# Patient Record
Sex: MALE | Race: WHITE | HISPANIC OR LATINO | Employment: UNEMPLOYED | URBAN - METROPOLITAN AREA
[De-identification: names, ages, dates, MRNs, and addresses within clinical notes are randomized per-mention and may not be internally consistent; named-entity substitution may affect disease eponyms.]

---

## 2017-09-08 ENCOUNTER — NON-PROVIDER VISIT (OUTPATIENT)
Dept: URGENT CARE | Facility: PHYSICIAN GROUP | Age: 42
End: 2017-09-08

## 2017-09-08 DIAGNOSIS — Z02.1 PRE-EMPLOYMENT DRUG SCREENING: ICD-10-CM

## 2017-09-08 LAB
AMP AMPHETAMINE: NORMAL
COC COCAINE: NORMAL
INT CON NEG: NEGATIVE
INT CON POS: POSITIVE
MET METHAMPHETAMINES: NORMAL
OPI OPIATES: NORMAL
PCP PHENCYCLIDINE: NORMAL
POC DRUG COMMENT 753798-OCCUPATIONAL HEALTH: NORMAL
THC: NORMAL

## 2017-09-08 PROCEDURE — 80305 DRUG TEST PRSMV DIR OPT OBS: CPT | Performed by: PHYSICIAN ASSISTANT

## 2020-10-25 ENCOUNTER — APPOINTMENT (OUTPATIENT)
Dept: RADIOLOGY | Facility: MEDICAL CENTER | Age: 45
End: 2020-10-25
Attending: EMERGENCY MEDICINE
Payer: COMMERCIAL

## 2020-10-25 ENCOUNTER — HOSPITAL ENCOUNTER (EMERGENCY)
Facility: MEDICAL CENTER | Age: 45
End: 2020-10-25
Attending: EMERGENCY MEDICINE
Payer: COMMERCIAL

## 2020-10-25 VITALS
WEIGHT: 258 LBS | SYSTOLIC BLOOD PRESSURE: 133 MMHG | BODY MASS INDEX: 40.49 KG/M2 | RESPIRATION RATE: 12 BRPM | TEMPERATURE: 96.8 F | OXYGEN SATURATION: 98 % | HEART RATE: 78 BPM | DIASTOLIC BLOOD PRESSURE: 68 MMHG | HEIGHT: 67 IN

## 2020-10-25 DIAGNOSIS — M54.9 PAIN, UPPER BACK: ICD-10-CM

## 2020-10-25 PROCEDURE — 71046 X-RAY EXAM CHEST 2 VIEWS: CPT

## 2020-10-25 PROCEDURE — 72070 X-RAY EXAM THORAC SPINE 2VWS: CPT

## 2020-10-25 PROCEDURE — 700111 HCHG RX REV CODE 636 W/ 250 OVERRIDE (IP): Performed by: EMERGENCY MEDICINE

## 2020-10-25 PROCEDURE — 96372 THER/PROPH/DIAG INJ SC/IM: CPT

## 2020-10-25 PROCEDURE — 99283 EMERGENCY DEPT VISIT LOW MDM: CPT

## 2020-10-25 RX ORDER — NAPROXEN 500 MG/1
500 TABLET ORAL 2 TIMES DAILY WITH MEALS
Qty: 20 TAB | Refills: 0 | Status: SHIPPED | OUTPATIENT
Start: 2020-10-25

## 2020-10-25 RX ORDER — KETOROLAC TROMETHAMINE 30 MG/ML
60 INJECTION, SOLUTION INTRAMUSCULAR; INTRAVENOUS ONCE
Status: COMPLETED | OUTPATIENT
Start: 2020-10-25 | End: 2020-10-25

## 2020-10-25 RX ORDER — ORPHENADRINE CITRATE 100 MG/1
100 TABLET, EXTENDED RELEASE ORAL 2 TIMES DAILY
Qty: 10 TAB | Refills: 0 | Status: SHIPPED | OUTPATIENT
Start: 2020-10-25 | End: 2020-10-30

## 2020-10-25 RX ADMIN — KETOROLAC TROMETHAMINE 60 MG: 30 INJECTION, SOLUTION INTRAMUSCULAR; INTRAVENOUS at 21:58

## 2020-10-26 NOTE — ED NOTES
Pt woken from sleep for discharge, drooling on bed, swinging arms reoriented to place, d/c home gait steady
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
## 2020-10-26 NOTE — ED TRIAGE NOTES
Pt waked into triage with steady gait c/o lower back pain, pt has a swollen area to the lower back that is red and warm to the touch, skin intact around the area     Pt & staff masked and in appropriate PPE during encounter.  Pt denies fever/travel or being in contact with anyone testing positive for Covid.  Explained pt the triage process, made pt aware to tell the RN/staff of any changes/concerns, pt verbalized understanding of process and instructions given.  Pt to ER jaci.

## 2020-10-26 NOTE — DISCHARGE INSTRUCTIONS
There is no sign of a significant problem in your back for the most likely cause of your pain is musculoskeletal strain or pinched nerve.  Use the medications that we have prescribed.  These types of episodes generally takes 2 to 3 weeks to fully resolve.  Heat, movement, and gentle stretching and massage can be helpful.    Use the medications we have prescribed for pain and muscle spasm.  See your primary care doctor.  If you do not have one, you can use the clinics listed here.

## 2021-02-15 ENCOUNTER — HOSPITAL ENCOUNTER (EMERGENCY)
Facility: MEDICAL CENTER | Age: 46
End: 2021-02-15
Attending: EMERGENCY MEDICINE
Payer: COMMERCIAL

## 2021-02-15 VITALS
HEART RATE: 98 BPM | TEMPERATURE: 96.7 F | WEIGHT: 251.77 LBS | OXYGEN SATURATION: 98 % | RESPIRATION RATE: 18 BRPM | HEIGHT: 67 IN | BODY MASS INDEX: 39.52 KG/M2 | DIASTOLIC BLOOD PRESSURE: 88 MMHG | SYSTOLIC BLOOD PRESSURE: 148 MMHG

## 2021-02-15 DIAGNOSIS — R36.9 PENILE DISCHARGE: ICD-10-CM

## 2021-02-15 DIAGNOSIS — N34.2 URETHRITIS: ICD-10-CM

## 2021-02-15 DIAGNOSIS — Z20.2 EXPOSURE TO SEXUALLY TRANSMITTED DISEASE (STD): ICD-10-CM

## 2021-02-15 LAB
APPEARANCE UR: ABNORMAL
BACTERIA #/AREA URNS HPF: NEGATIVE /HPF
BILIRUB UR QL STRIP.AUTO: NEGATIVE
COLOR UR: YELLOW
EPI CELLS #/AREA URNS HPF: NEGATIVE /HPF
GLUCOSE UR STRIP.AUTO-MCNC: NEGATIVE MG/DL
HYALINE CASTS #/AREA URNS LPF: ABNORMAL /LPF
KETONES UR STRIP.AUTO-MCNC: NEGATIVE MG/DL
LEUKOCYTE ESTERASE UR QL STRIP.AUTO: ABNORMAL
MICRO URNS: ABNORMAL
NITRITE UR QL STRIP.AUTO: NEGATIVE
PH UR STRIP.AUTO: 5.5 [PH] (ref 5–8)
PROT UR QL STRIP: NEGATIVE MG/DL
RBC # URNS HPF: ABNORMAL /HPF
RBC UR QL AUTO: ABNORMAL
SP GR UR STRIP.AUTO: >=1.03
TREPONEMA PALLIDUM IGG+IGM AB [PRESENCE] IN SERUM OR PLASMA BY IMMUNOASSAY: NORMAL
UROBILINOGEN UR STRIP.AUTO-MCNC: 0.2 MG/DL
WBC #/AREA URNS HPF: ABNORMAL /HPF

## 2021-02-15 PROCEDURE — 87491 CHLMYD TRACH DNA AMP PROBE: CPT

## 2021-02-15 PROCEDURE — 87086 URINE CULTURE/COLONY COUNT: CPT

## 2021-02-15 PROCEDURE — 99284 EMERGENCY DEPT VISIT MOD MDM: CPT

## 2021-02-15 PROCEDURE — 700102 HCHG RX REV CODE 250 W/ 637 OVERRIDE(OP): Performed by: EMERGENCY MEDICINE

## 2021-02-15 PROCEDURE — 36415 COLL VENOUS BLD VENIPUNCTURE: CPT

## 2021-02-15 PROCEDURE — 81001 URINALYSIS AUTO W/SCOPE: CPT

## 2021-02-15 PROCEDURE — 96372 THER/PROPH/DIAG INJ SC/IM: CPT

## 2021-02-15 PROCEDURE — 86780 TREPONEMA PALLIDUM: CPT

## 2021-02-15 PROCEDURE — A9270 NON-COVERED ITEM OR SERVICE: HCPCS | Performed by: EMERGENCY MEDICINE

## 2021-02-15 PROCEDURE — 700111 HCHG RX REV CODE 636 W/ 250 OVERRIDE (IP): Performed by: EMERGENCY MEDICINE

## 2021-02-15 PROCEDURE — 87591 N.GONORRHOEAE DNA AMP PROB: CPT

## 2021-02-15 RX ORDER — DOXYCYCLINE 100 MG/1
100 CAPSULE ORAL 2 TIMES DAILY
Qty: 14 CAPSULE | Refills: 0 | Status: SHIPPED | OUTPATIENT
Start: 2021-02-15 | End: 2021-02-22

## 2021-02-15 RX ORDER — CEFTRIAXONE 500 MG/1
500 INJECTION, POWDER, FOR SOLUTION INTRAMUSCULAR; INTRAVENOUS ONCE
Status: COMPLETED | OUTPATIENT
Start: 2021-02-15 | End: 2021-02-15

## 2021-02-15 RX ORDER — DOXYCYCLINE 100 MG/1
100 TABLET ORAL ONCE
Status: COMPLETED | OUTPATIENT
Start: 2021-02-15 | End: 2021-02-15

## 2021-02-15 RX ADMIN — CEFTRIAXONE SODIUM 500 MG: 500 INJECTION, POWDER, FOR SOLUTION INTRAMUSCULAR; INTRAVENOUS at 09:32

## 2021-02-15 RX ADMIN — PENICILLIN G BENZATHINE 2.4 MILLION UNITS: 1200000 INJECTION, SUSPENSION INTRAMUSCULAR at 09:32

## 2021-02-15 RX ADMIN — DOXYCYCLINE 100 MG: 100 TABLET, FILM COATED ORAL at 09:36

## 2021-02-15 NOTE — ED NOTES
Pt ambulatory to [facility].  Pt refuses to get off cell phone for assessment and refuses to get into gown.  Chart up for ERP.

## 2021-02-15 NOTE — ED NOTES
Pt medicated per mar.  Pt verbalizes understanding of discharge and follow-up instructions.  VSS.  All questions answered.  Ambulates to discharge with steady gait.

## 2021-02-15 NOTE — DISCHARGE INSTRUCTIONS
Follow-up with primary care or [facility] health department this week for reevaluation, medication management and additional testing as indicated.    Doxycycline twice daily for 7 days.    Return to the emergency department for persistent or worsening penile pain, drainage, difficulty urinating, scrotal pain or swelling, abdominal pain, fever, vomiting or other new concerns.

## 2021-02-15 NOTE — ED NOTES
Blood drawn and sent to lab.  Pt states that he is currently unable to provide a urine sample due to his bladder feeling empty.  Pt provided water per pt request.

## 2021-02-15 NOTE — ED TRIAGE NOTES
Amb to triage w/ c/o penile discharge, painful urination & a sore on tip of penis x 2 days.  Pt states his sig other reports similar symptoms and is being treated for an STD.

## 2021-02-15 NOTE — ED PROVIDER NOTES
"ED Provider Note    CHIEF COMPLAINT  Chief Complaint   Patient presents with   • Penile Discharge       HPI  Vega Harrell is a 45 y.o. male who presents to the emergency department through triage for penile pain and drainage.  Patient believes he has a \"venereal disease\" as he states that his girlfriend recently told him she has 1.  He does not know which infection she is being treated for.  Mild discomfort with urination for 2 days, drainage from penile tip for 2 days as well a \"sore\" near the tip of his penis.  No abdominal pain or flank pain.  No scrotal pain or swelling.  No hematuria.  No fever chills.  Denies history of similar symptoms with previous STD.    REVIEW OF SYSTEMS  See HPI for further details. All other systems are negative.     PAST MEDICAL HISTORY   has a past medical history of Diabetes (HCC) and Sleep apnea.    SOCIAL HISTORY  Social History     Tobacco Use   • Smoking status: Current Every Day Smoker     Packs/day: 1.00     Types: Cigarettes   Substance and Sexual Activity   • Alcohol use: Yes     Comment: \"a lot everyday\"    • Drug use: Yes     Comment: DMT   • Sexual activity: Not on file       SURGICAL HISTORY  patient denies any surgical history    CURRENT MEDICATIONS  Home Medications    **Home medications have not yet been reviewed for this encounter**         ALLERGIES  Allergies   Allergen Reactions   • Tagafed [Triprolidine-Pse]        PHYSICAL EXAM  VITAL SIGNS: /94   Pulse (!) 114   Temp 35.9 °C (96.7 °F) (Temporal)   Resp 16   Ht 1.702 m (5' 7\")   Wt 114 kg (251 lb 12.3 oz)   SpO2 95%   BMI 39.43 kg/m²   Pulse ox interpretation: I interpret this pulse ox as normal.  Constitutional: Alert in no apparent distress.  Anxious.  HENT: Normocephalic, atraumatic. Bilateral external ears normal, Nose normal.   Eyes: Conjunctiva normal.   Neck: Normal range of motion.   Lymphatic: No lymphadenopathy noted.  No inguinal mass or lymphadenopathy.  Cardiovascular: Normal " peripheral perfusion.  Thorax & Lungs: Nonlabored respirations.  Abdomen: Soft, non-distended, non-tender to palpation.   : Circumcised.  Clear, slightly yellow tinted discharge from meatus.  Small, subcentimeter, dry, tender excoriated lesion to the dorsal tip of the penis at the 12 o'clock position of the meatus.  No vesicle or obvious canker.  No scrotal pain, discoloration or swelling.  Skin: Warm, Dry, No erythema, No rash.   Musculoskeletal: Good range of motion in all major joints.   Neurologic: Alert and orient x4.  Ambulates independently.  Psychiatric: Affect normal, Judgment normal, Mood normal.       DIAGNOSTIC STUDIES / PROCEDURE    LABS  Pending    COURSE & MEDICAL DECISION MAKING  Nursing notes and vital signs were reviewed. (See chart for details)  The patients records were reviewed, history was obtained from the patient;     Evaluation is most suggestive of urethritis, likely secondary to STD as patient has been recently exposed.  His exposure specific infection is unknown.  He does have penile drainage.  Urine testing pending, patient given Rocephin 500 mg intramuscularly in the emergency department first dose of doxycycline.  He has an excoriated tender lesion at the tip of the penis, not entirely consistent with canker, but he will be tested and treated empirically for syphilis as well with penicillin G2 400,000 units intramuscularly.  He urinates without difficulty.  There is no clinical evidence for pyelonephritis.  Abdominal exam is benign.  Hemodynamically stable otherwise, mild tachycardia is appropriate in this setting as he appears quite anxious as well, no fever or hypotension.    Patient is stable for discharge at this time, anticipatory guidance provided, doxycycline for 7 days, close follow-up is encouraged with the health department and advised for additional testing to include HIV or hepatitis if indicated, and strict ED return instructions have been detailed. Patient is agreeable  to the disposition and plan.    Patient's blood pressure was elevated in the emergency department, and has been referred to primary care for close monitoring.    FINAL IMPRESSION  (R36.9) Penile discharge  (N34.2) Urethritis  (Z20.2) Exposure to sexually transmitted disease (STD)      Electronically signed by: Shayna White D.O., 2/15/2021 8:55 AM      This dictation was created using voice recognition software. The accuracy of the dictation is limited to the abilities of the software. I expect there may be some errors of grammar and possibly content. The nursing notes were reviewed and certain aspects of this information were incorporated into this note.

## 2021-02-16 LAB
C TRACH DNA SPEC QL NAA+PROBE: NEGATIVE
N GONORRHOEA DNA SPEC QL NAA+PROBE: POSITIVE
SPECIMEN SOURCE: ABNORMAL

## 2021-02-17 LAB
BACTERIA UR CULT: ABNORMAL
BACTERIA UR CULT: ABNORMAL
SIGNIFICANT IND 70042: ABNORMAL
SITE SITE: ABNORMAL
SOURCE SOURCE: ABNORMAL

## 2021-02-17 NOTE — ED NOTES
"ED Positive Culture Follow-up/Notification Note:    Date: 2/17/21     Patient seen in the ED on 2/15/2021 for penile discharge and STI exposure.     1. Penile discharge    2. Urethritis    3. Exposure to sexually transmitted disease (STD)       Discharge Medication List as of 2/15/2021  9:37 AM      START taking these medications    Details   doxycycline (MONODOX) 100 MG capsule Take 1 capsule by mouth 2 times a day for 7 days., Disp-14 capsule, R-0, Normal             Allergies: Tagafed [triprolidine-pse]     Vitals:    02/15/21 0810 02/15/21 0814 02/15/21 0942   BP: 155/94  148/88   Pulse: (!) 114  98   Resp: 16  18   Temp: 35.9 °C (96.7 °F)     TempSrc: Temporal     SpO2: 95%  98%   Weight:  114 kg (251 lb 12.3 oz)    Height: 1.702 m (5' 7\") 1.702 m (5' 7\")        Final cultures:   Results     Procedure Component Value Units Date/Time    Chlamydia/GC PCR Urine Or Swab [[id number]]  (Abnormal) Collected: 02/15/21 0913    Order Status: Completed Specimen: Urine from Genital Updated: 02/16/21 1848     C. trachomatis by PCR Negative     N. gonorrhoeae by PCR POSITIVE     Source URINE    Narrative:      Indication for culture:->Patient WITHOUT an indwelling Bruce  catheter in place with new onset of Dysuria, Frequency,  Urgency, and/or Suprapubic pain    URINE CULTURE(NEW) [[id number]] Collected: 02/15/21 0913    Order Status: Completed Specimen: Urine Updated: 02/16/21 1430     Significant Indicator NEG     Source UR     Site -     Culture Result No growth at 24 hours.    Narrative:      Indication for culture:->Patient WITHOUT an indwelling Bruce  catheter in place with new onset of Dysuria, Frequency,  Urgency, and/or Suprapubic pain    URINALYSIS CULTURE, IF INDICATED [[id number]]  (Abnormal) Collected: 02/15/21 0913    Order Status: Completed Specimen: Urine Updated: 02/15/21 1021     Color Yellow     Character Hazy     Specific Gravity >=1.030     Ph 5.5     Glucose Negative mg/dL      Ketones Negative mg/dL      " Protein Negative mg/dL      Bilirubin Negative     Urobilinogen, Urine 0.2     Nitrite Negative     Leukocyte Esterase Small     Occult Blood Trace     Micro Urine Req Microscopic    Narrative:      Indication for culture:->Patient WITHOUT an indwelling Bruce  catheter in place with new onset of Dysuria, Frequency,  Urgency, and/or Suprapubic pain          Plan:   Patient treated for gonorrhea in the ER. No additional treatment necessary and does not need to continue the doxycyline. Spoke to the patient via telephone. Counseled the patient to abstain from sexual intercourse 7 days after antibiotic therapy is completed, to stop taking the doxycycline and discard the remaining capsules, to notify any sexual partners within the last 60 days to go the the Health Department for testing, to seek further HIV/STD testing, and to seek medical attention if symptoms persist. Patient voiced understanding and did not have any other questions.    Yesika House, PharmD  T: [phone]

## 2021-05-01 ENCOUNTER — HOSPITAL ENCOUNTER (EMERGENCY)
Dept: HOSPITAL 8 - ED | Age: 46
Discharge: HOME | End: 2021-05-01
Payer: SELF-PAY

## 2021-05-01 VITALS — DIASTOLIC BLOOD PRESSURE: 88 MMHG | SYSTOLIC BLOOD PRESSURE: 152 MMHG

## 2021-05-01 VITALS — BODY MASS INDEX: 38.66 KG/M2 | HEIGHT: 66 IN | WEIGHT: 240.52 LBS

## 2021-05-01 DIAGNOSIS — E11.620: ICD-10-CM

## 2021-05-01 DIAGNOSIS — R06.89: ICD-10-CM

## 2021-05-01 DIAGNOSIS — R05: ICD-10-CM

## 2021-05-01 DIAGNOSIS — E11.65: Primary | ICD-10-CM

## 2021-05-01 DIAGNOSIS — J02.9: ICD-10-CM

## 2021-05-01 DIAGNOSIS — Z20.822: ICD-10-CM

## 2021-05-01 LAB
ALBUMIN SERPL-MCNC: 3.3 G/DL (ref 3.4–5)
ALP SERPL-CCNC: 109 U/L (ref 45–117)
ALT SERPL-CCNC: 59 U/L (ref 12–78)
ANION GAP SERPL CALC-SCNC: 5 MMOL/L (ref 5–15)
BASOPHILS # BLD AUTO: 0 X10^3/UL (ref 0–0.1)
BASOPHILS NFR BLD AUTO: 1 % (ref 0–1)
BILIRUB SERPL-MCNC: 0.4 MG/DL (ref 0.2–1)
CALCIUM SERPL-MCNC: 9 MG/DL (ref 8.5–10.1)
CHLORIDE SERPL-SCNC: 103 MMOL/L (ref 98–107)
CREAT SERPL-MCNC: 1.28 MG/DL (ref 0.7–1.3)
EOSINOPHIL # BLD AUTO: 0.3 X10^3/UL (ref 0–0.4)
EOSINOPHIL NFR BLD AUTO: 4 % (ref 1–7)
ERYTHROCYTE [DISTWIDTH] IN BLOOD BY AUTOMATED COUNT: 12.2 % (ref 9.4–14.8)
LYMPHOCYTES # BLD AUTO: 2.4 X10^3/UL (ref 1–3.4)
LYMPHOCYTES NFR BLD AUTO: 32 % (ref 22–44)
MCH RBC QN AUTO: 31.3 PG (ref 27.5–34.5)
MCHC RBC AUTO-ENTMCNC: 34.8 G/DL (ref 33.2–36.2)
MD: NO
MICROSCOPIC: (no result)
MONOCYTES # BLD AUTO: 0.7 X10^3/UL (ref 0.2–0.8)
MONOCYTES NFR BLD AUTO: 9 % (ref 2–9)
NEUTROPHILS # BLD AUTO: 4.1 X10^3/UL (ref 1.8–6.8)
NEUTROPHILS NFR BLD AUTO: 55 % (ref 42–75)
PLATELET # BLD AUTO: 220 X10^3/UL (ref 130–400)
PMV BLD AUTO: 9.3 FL (ref 7.4–10.4)
PROT SERPL-MCNC: 6.8 G/DL (ref 6.4–8.2)
RBC # BLD AUTO: 5.08 X10^6/UL (ref 4.38–5.82)

## 2021-05-01 PROCEDURE — 80053 COMPREHEN METABOLIC PANEL: CPT

## 2021-05-01 PROCEDURE — 82962 GLUCOSE BLOOD TEST: CPT

## 2021-05-01 PROCEDURE — 96361 HYDRATE IV INFUSION ADD-ON: CPT

## 2021-05-01 PROCEDURE — U0003 INFECTIOUS AGENT DETECTION BY NUCLEIC ACID (DNA OR RNA); SEVERE ACUTE RESPIRATORY SYNDROME CORONAVIRUS 2 (SARS-COV-2) (CORONAVIRUS DISEASE [COVID-19]), AMPLIFIED PROBE TECHNIQUE, MAKING USE OF HIGH THROUGHPUT TECHNOLOGIES AS DESCRIBED BY CMS-2020-01-R: HCPCS

## 2021-05-01 PROCEDURE — 81003 URINALYSIS AUTO W/O SCOPE: CPT

## 2021-05-01 PROCEDURE — 85025 COMPLETE CBC W/AUTO DIFF WBC: CPT

## 2021-05-01 PROCEDURE — 99284 EMERGENCY DEPT VISIT MOD MDM: CPT

## 2021-05-01 PROCEDURE — 36415 COLL VENOUS BLD VENIPUNCTURE: CPT

## 2021-05-01 PROCEDURE — 71045 X-RAY EXAM CHEST 1 VIEW: CPT

## 2021-05-01 PROCEDURE — 96374 THER/PROPH/DIAG INJ IV PUSH: CPT

## 2021-05-01 NOTE — NUR
PT AMBULATED TO ROOM FROM TRIAGE. PT CO HIGH BS FOR THE PAST 2 WEEKS. PT STATED 
THAT HIS GLUCOMETER KEEPS READING "HI" DESPITE TAKING METFORMIN DAILY. PT 
STATED THAT HE HAS HAD INCREASED URINATION AND THIRST FOR 2 WEEKS. PT ALSO 
STATED THAT HE WAS EXPOSED TO A FAMILY MEMBER POSITIVE FOR COVID 2 WEEKS AGO. 
PT STATED THAT HE HAS A MILD COUGH AND BODY ACHES.

## 2021-05-20 ENCOUNTER — HOSPITAL ENCOUNTER (EMERGENCY)
Facility: MEDICAL CENTER | Age: 46
End: 2021-05-20
Attending: EMERGENCY MEDICINE
Payer: COMMERCIAL

## 2021-05-20 VITALS
HEART RATE: 80 BPM | BODY MASS INDEX: 39.24 KG/M2 | WEIGHT: 250 LBS | SYSTOLIC BLOOD PRESSURE: 128 MMHG | TEMPERATURE: 98.2 F | RESPIRATION RATE: 16 BRPM | OXYGEN SATURATION: 98 % | DIASTOLIC BLOOD PRESSURE: 77 MMHG | HEIGHT: 67 IN

## 2021-05-20 DIAGNOSIS — R53.83 FATIGUE, UNSPECIFIED TYPE: ICD-10-CM

## 2021-05-20 DIAGNOSIS — R73.9 HYPERGLYCEMIA: ICD-10-CM

## 2021-05-20 LAB
ALBUMIN SERPL BCP-MCNC: 4.4 G/DL (ref 3.2–4.9)
ALBUMIN/GLOB SERPL: 1.4 G/DL
ALP SERPL-CCNC: 112 U/L (ref 30–99)
ALT SERPL-CCNC: 39 U/L (ref 2–50)
ANION GAP SERPL CALC-SCNC: 11 MMOL/L (ref 7–16)
AST SERPL-CCNC: 26 U/L (ref 12–45)
BASE EXCESS BLDV CALC-SCNC: -2 MMOL/L
BASOPHILS # BLD AUTO: 0.3 % (ref 0–1.8)
BASOPHILS # BLD: 0.02 K/UL (ref 0–0.12)
BILIRUB SERPL-MCNC: 0.4 MG/DL (ref 0.1–1.5)
BODY TEMPERATURE: ABNORMAL CENTIGRADE
BUN SERPL-MCNC: 14 MG/DL (ref 8–22)
CALCIUM SERPL-MCNC: 10.2 MG/DL (ref 8.5–10.5)
CHLORIDE SERPL-SCNC: 99 MMOL/L (ref 96–112)
CO2 SERPL-SCNC: 24 MMOL/L (ref 20–33)
CREAT SERPL-MCNC: 0.92 MG/DL (ref 0.5–1.4)
EOSINOPHIL # BLD AUTO: 0.09 K/UL (ref 0–0.51)
EOSINOPHIL NFR BLD: 1.3 % (ref 0–6.9)
ERYTHROCYTE [DISTWIDTH] IN BLOOD BY AUTOMATED COUNT: 37.5 FL (ref 35.9–50)
GLOBULIN SER CALC-MCNC: 3.1 G/DL (ref 1.9–3.5)
GLUCOSE BLD-MCNC: 167 MG/DL (ref 65–99)
GLUCOSE BLD-MCNC: 296 MG/DL (ref 65–99)
GLUCOSE BLD-MCNC: 356 MG/DL (ref 65–99)
GLUCOSE SERPL-MCNC: 344 MG/DL (ref 65–99)
HCO3 BLDV-SCNC: 22 MMOL/L (ref 24–28)
HCT VFR BLD AUTO: 47.7 % (ref 42–52)
HGB BLD-MCNC: 16.6 G/DL (ref 14–18)
IMM GRANULOCYTES # BLD AUTO: 0.05 K/UL (ref 0–0.11)
IMM GRANULOCYTES NFR BLD AUTO: 0.7 % (ref 0–0.9)
LYMPHOCYTES # BLD AUTO: 2.08 K/UL (ref 1–4.8)
LYMPHOCYTES NFR BLD: 29.9 % (ref 22–41)
MCH RBC QN AUTO: 30.5 PG (ref 27–33)
MCHC RBC AUTO-ENTMCNC: 34.8 G/DL (ref 33.7–35.3)
MCV RBC AUTO: 87.7 FL (ref 81.4–97.8)
MONOCYTES # BLD AUTO: 0.48 K/UL (ref 0–0.85)
MONOCYTES NFR BLD AUTO: 6.9 % (ref 0–13.4)
NEUTROPHILS # BLD AUTO: 4.24 K/UL (ref 1.82–7.42)
NEUTROPHILS NFR BLD: 60.9 % (ref 44–72)
NRBC # BLD AUTO: 0 K/UL
NRBC BLD-RTO: 0 /100 WBC
PCO2 BLDV: 36.6 MMHG (ref 41–51)
PH BLDV: 7.4 [PH] (ref 7.31–7.45)
PLATELET # BLD AUTO: 205 K/UL (ref 164–446)
PMV BLD AUTO: 11.2 FL (ref 9–12.9)
PO2 BLDV: 28.5 MMHG (ref 25–40)
POTASSIUM SERPL-SCNC: 4 MMOL/L (ref 3.6–5.5)
PROT SERPL-MCNC: 7.5 G/DL (ref 6–8.2)
RBC # BLD AUTO: 5.44 M/UL (ref 4.7–6.1)
SAO2 % BLDV: 57.5 %
SODIUM SERPL-SCNC: 134 MMOL/L (ref 135–145)
WBC # BLD AUTO: 7 K/UL (ref 4.8–10.8)

## 2021-05-20 PROCEDURE — 96374 THER/PROPH/DIAG INJ IV PUSH: CPT

## 2021-05-20 PROCEDURE — 82803 BLOOD GASES ANY COMBINATION: CPT

## 2021-05-20 PROCEDURE — 82962 GLUCOSE BLOOD TEST: CPT | Mod: 91

## 2021-05-20 PROCEDURE — 85025 COMPLETE CBC W/AUTO DIFF WBC: CPT

## 2021-05-20 PROCEDURE — 700102 HCHG RX REV CODE 250 W/ 637 OVERRIDE(OP): Performed by: EMERGENCY MEDICINE

## 2021-05-20 PROCEDURE — 80053 COMPREHEN METABOLIC PANEL: CPT

## 2021-05-20 PROCEDURE — 94760 N-INVAS EAR/PLS OXIMETRY 1: CPT

## 2021-05-20 PROCEDURE — 700105 HCHG RX REV CODE 258: Performed by: EMERGENCY MEDICINE

## 2021-05-20 PROCEDURE — 99284 EMERGENCY DEPT VISIT MOD MDM: CPT

## 2021-05-20 RX ORDER — SODIUM CHLORIDE, SODIUM LACTATE, POTASSIUM CHLORIDE, CALCIUM CHLORIDE 600; 310; 30; 20 MG/100ML; MG/100ML; MG/100ML; MG/100ML
2000 INJECTION, SOLUTION INTRAVENOUS ONCE
Status: COMPLETED | OUTPATIENT
Start: 2021-05-20 | End: 2021-05-20

## 2021-05-20 RX ADMIN — INSULIN HUMAN 10 UNITS: 100 INJECTION, SOLUTION PARENTERAL at 19:34

## 2021-05-20 RX ADMIN — SODIUM CHLORIDE, POTASSIUM CHLORIDE, SODIUM LACTATE AND CALCIUM CHLORIDE 2000 ML: 600; 310; 30; 20 INJECTION, SOLUTION INTRAVENOUS at 19:18

## 2021-05-20 NOTE — ED NOTES
"Pt ambulated to room. Pt upset because \"I am thirsty and no ones giving me water\". Pt uncooperative with staff. Pt refuses to change into a gown and stated \"Im leaving because I dont want to wear a gown\". Pt refusing to sign AMA. Pt ambulated out of room and left.   "

## 2021-05-21 NOTE — ED NOTES
PIV established, labs sent.,  Pt report metformin gives him abd pain/diahrrea so he has not taken it in a few days

## 2021-05-21 NOTE — ED NOTES
DC instructions reviewed with pt. Pt verbalized understanding. Pt ambulated to [facility] with steady gait.

## 2021-05-21 NOTE — ED PROVIDER NOTES
"ED Provider Note    Scribed for Landon León M.D. by Roxanna Porras. 5/20/2021  6:10 PM    Primary care provider: Pcp Pt States None  Means of arrival: Walk in  History obtained from: patient   History limited by: none    CHIEF COMPLAINT  Chief Complaint   Patient presents with    Hyperglycemia     Pt BIB self to triage for c/o hyperglycemia. pt states has not been able to control it for about 2wks. pt states to only have long lasting insulin at home.  in triage    Fatigue       HPI  Vega Chery is a 46 y.o. male with a history of diabetes, who presents to the Emergency Department for hyperglycemia onset 2 weeks ago. Patient states he only las his lantis at home and does not have any short acting insulin for around 1 month. He has an appointment scheduled at [facility] tomorrow. Patient states he was seen at [facility] two weeks ago for the same symptoms, but was not given any prescriptions. He has a new rash to his right lower leg. Denies fevers, nausea, or vomiting.    REVIEW OF SYSTEMS  Pertinent positives include hyperglycemia and rash. Pertinent negatives include no fever, nausea, or vomiting.  All other systems reviewed and negative.    PAST MEDICAL HISTORY   has a past medical history of Diabetes (HCC) and Sleep apnea.    SURGICAL HISTORY  patient denies any surgical history    SOCIAL HISTORY  Social History     Tobacco Use    Smoking status: Current Every Day Smoker     Packs/day: 1.00     Types: Cigarettes    Smokeless tobacco: Never Used   Substance Use Topics    Alcohol use: Yes     Comment: \"a lot everyday\"     Drug use: Yes     Comment: DMT      Social History     Substance and Sexual Activity   Drug Use Yes    Comment: DMT       FAMILY HISTORY  History reviewed. No pertinent family history.    CURRENT MEDICATIONS  Home Medications    **Home medications have not yet been reviewed for this encounter**         ALLERGIES  Allergies   Allergen Reactions    Tagafed [Triprolidine-Pse]        PHYSICAL " "EXAM  VITAL SIGNS: BP (!) 163/102   Pulse (!) 119   Temp 36.8 °C (98.2 °F) (Oral)   Resp 20   Ht 1.702 m (5' 7\")   Wt 113 kg (250 lb)   SpO2 96%   BMI 39.16 kg/m²     Constitutional: Well developed, Well nourished, Non-toxic appearance.   HENT: Normocephalic, Atraumatic, Bilateral external ears normal, Dry mucous membranes, No oral exudates.   Eyes: PERRLA, EOMI, Conjunctiva normal, No discharge.   Neck: No tenderness, Supple, No stridor.   Lymphatic: No lymphadenopathy noted.   Cardiovascular: Tachycardic, Normal rhythm.   Thorax & Lungs: Clear to auscultation bilaterally, No respiratory distress, No wheezing, No crackles.   Abdomen: Soft, No tenderness, No masses, No pulsatile masses.   Skin: Warm, Dry, No erythema, slight petechial rash bilateral lower extremities.   Extremities:, No edema No cyanosis.   Musculoskeletal: No tenderness to palpation or major deformities noted.  Intact distal pulses  Neurologic: Awake, alert. Moves all extremities spontaneously.  Psychiatric: Affect normal, Judgment normal, Mood normal.     LABS  Results for orders placed or performed during the hospital encounter of 05/20/21   CBC WITH DIFFERENTIAL   Result Value Ref Range    WBC 7.0 4.8 - 10.8 K/uL    RBC 5.44 4.70 - 6.10 M/uL    Hemoglobin 16.6 14.0 - 18.0 g/dL    Hematocrit 47.7 42.0 - 52.0 %    MCV 87.7 81.4 - 97.8 fL    MCH 30.5 27.0 - 33.0 pg    MCHC 34.8 33.7 - 35.3 g/dL    RDW 37.5 35.9 - 50.0 fL    Platelet Count 205 164 - 446 K/uL    MPV 11.2 9.0 - 12.9 fL    Neutrophils-Polys 60.90 44.00 - 72.00 %    Lymphocytes 29.90 22.00 - 41.00 %    Monocytes 6.90 0.00 - 13.40 %    Eosinophils 1.30 0.00 - 6.90 %    Basophils 0.30 0.00 - 1.80 %    Immature Granulocytes 0.70 0.00 - 0.90 %    Nucleated RBC 0.00 /100 WBC    Neutrophils (Absolute) 4.24 1.82 - 7.42 K/uL    Lymphs (Absolute) 2.08 1.00 - 4.80 K/uL    Monos (Absolute) 0.48 0.00 - 0.85 K/uL    Eos (Absolute) 0.09 0.00 - 0.51 K/uL    Baso (Absolute) 0.02 0.00 - 0.12 K/uL "    Immature Granulocytes (abs) 0.05 0.00 - 0.11 K/uL    NRBC (Absolute) 0.00 K/uL   COMP METABOLIC PANEL   Result Value Ref Range    Sodium 134 (L) 135 - 145 mmol/L    Potassium 4.0 3.6 - 5.5 mmol/L    Chloride 99 96 - 112 mmol/L    Co2 24 20 - 33 mmol/L    Anion Gap 11.0 7.0 - 16.0    Glucose 344 (H) 65 - 99 mg/dL    Bun 14 8 - 22 mg/dL    Creatinine 0.92 0.50 - 1.40 mg/dL    Calcium 10.2 8.5 - 10.5 mg/dL    AST(SGOT) 26 12 - 45 U/L    ALT(SGPT) 39 2 - 50 U/L    Alkaline Phosphatase 112 (H) 30 - 99 U/L    Total Bilirubin 0.4 0.1 - 1.5 mg/dL    Albumin 4.4 3.2 - 4.9 g/dL    Total Protein 7.5 6.0 - 8.2 g/dL    Globulin 3.1 1.9 - 3.5 g/dL    A-G Ratio 1.4 g/dL   VENOUS BLOOD GAS   Result Value Ref Range    Venous Bg Ph 7.40 7.31 - 7.45    Venous Bg Pco2 36.6 (L) 41.0 - 51.0 mmHg    Venous Bg Po2 28.5 25.0 - 40.0 mmHg    Venous Bg O2 Saturation 57.5 %    Venous Bg Hco3 22 (L) 24 - 28 mmol/L    Venous Bg Base Excess -2 mmol/L    Body Temp see below Centigrade   ESTIMATED GFR   Result Value Ref Range    GFR If African American >60 >60 mL/min/1.73 m 2    GFR If Non African American >60 >60 mL/min/1.73 m 2   POCT glucose device results   Result Value Ref Range    Glucose - Accu-Ck 356 (H) 65 - 99 mg/dL   POCT glucose device results   Result Value Ref Range    Glucose - Accu-Ck 296 (H) 65 - 99 mg/dL        COURSE & MEDICAL DECISION MAKING  Pertinent Labs & Imaging studies reviewed. (See chart for details)    6:10 PM - Patient seen and examined at bedside.  I discussed that we will obtain labs to further evaluate for DKA. Patient will be treated with IV fluids for hyperglycemia. Ordered VBG, beta-hydroxybutyric acid, CBC w/ diff, CMP, POCT UA, and POCT glucose to evaluate his symptoms. The differential diagnoses include but are not limited to: hyperglycemia vs DKA.    7:18 PM - Reviewed the patient's lab results. He will be treated with insulin regular.    8:04 PM - Patient was reevaluated at bedside. Discussed lab results  with the patient and informed them that we will recheck his blood glucose and if this is improve he will be discharged home.      9:38 PM - Patients blood glucose is improved at 296. Advised him to keep his appointment with the [facility] clinic tomorrow. Discussed return precautions. Patient will be discharged at this time. He verbalizes agreement with discharge and plan of care.     Decision Making:  Patient with hyperglycemia, no evidence of DKA, give the patient 2 L of fluid for his hyperglycemia with improvement of the patient's symptoms, give the patient 10 units of insulin IV which brought the patient's sugar down to 160, the patient has follow-up appoint with the [facility] clinic tomorrow, the patient will return with any concerns.    HYDRATION: Based on the patient's presentation of Hyperglycemia the patient was given IV fluids. IV Hydration was used because oral hydration was not adequate alone. Upon recheck following hydration, the patient was improved.    The patient will return for new or worsening symptoms and is stable at the time of discharge.    DISPOSITION:  Patient will be discharged home in stable condition.    FOLLOW UP:  [facility], Emergency Dept  [address] [place] [id number] [phone]    If symptoms worsen    FINAL IMPRESSION  1. Hyperglycemia    2. Fatigue, unspecified type          I, Roxanna Porras (Maritoibhilary), am scribing for, and in the presence of, Landon León M.D..    Electronically signed by: Roxanna Porras (Maritoibhilary), 5/20/2021    ILandon M.D. personally performed the services described in this documentation, as scribed by Roxanna Porras in my presence, and it is both accurate and complete. C    The note accurately reflects work and decisions made by me.  Landon León M.D.  5/20/2021  10:24 PM

## 2021-06-11 PROCEDURE — 99285 EMERGENCY DEPT VISIT HI MDM: CPT

## 2021-06-11 ASSESSMENT — FIBROSIS 4 INDEX: FIB4 SCORE: 0.91

## 2021-06-12 ENCOUNTER — HOSPITAL ENCOUNTER (EMERGENCY)
Facility: MEDICAL CENTER | Age: 46
End: 2021-06-12
Attending: EMERGENCY MEDICINE
Payer: COMMERCIAL

## 2021-06-12 ENCOUNTER — APPOINTMENT (OUTPATIENT)
Dept: RADIOLOGY | Facility: MEDICAL CENTER | Age: 46
End: 2021-06-12
Attending: EMERGENCY MEDICINE
Payer: COMMERCIAL

## 2021-06-12 VITALS
RESPIRATION RATE: 16 BRPM | HEIGHT: 67 IN | TEMPERATURE: 98 F | SYSTOLIC BLOOD PRESSURE: 130 MMHG | OXYGEN SATURATION: 98 % | WEIGHT: 225 LBS | DIASTOLIC BLOOD PRESSURE: 88 MMHG | BODY MASS INDEX: 35.31 KG/M2 | HEART RATE: 98 BPM

## 2021-06-12 DIAGNOSIS — S82.832A CLOSED FRACTURE OF DISTAL END OF LEFT FIBULA, UNSPECIFIED FRACTURE MORPHOLOGY, INITIAL ENCOUNTER: ICD-10-CM

## 2021-06-12 PROCEDURE — 29515 APPLICATION SHORT LEG SPLINT: CPT

## 2021-06-12 PROCEDURE — 700111 HCHG RX REV CODE 636 W/ 250 OVERRIDE (IP): Performed by: EMERGENCY MEDICINE

## 2021-06-12 PROCEDURE — 302874 HCHG BANDAGE ACE 2 OR 3""

## 2021-06-12 PROCEDURE — 700102 HCHG RX REV CODE 250 W/ 637 OVERRIDE(OP): Performed by: EMERGENCY MEDICINE

## 2021-06-12 PROCEDURE — 302875 HCHG BANDAGE ACE 4 OR 6""

## 2021-06-12 PROCEDURE — 73590 X-RAY EXAM OF LOWER LEG: CPT | Mod: LT

## 2021-06-12 PROCEDURE — A9270 NON-COVERED ITEM OR SERVICE: HCPCS | Performed by: EMERGENCY MEDICINE

## 2021-06-12 PROCEDURE — 73610 X-RAY EXAM OF ANKLE: CPT | Mod: LT

## 2021-06-12 PROCEDURE — 96374 THER/PROPH/DIAG INJ IV PUSH: CPT | Mod: XU

## 2021-06-12 RX ORDER — HYDROCODONE BITARTRATE AND ACETAMINOPHEN 5; 325 MG/1; MG/1
2 TABLET ORAL ONCE
Status: COMPLETED | OUTPATIENT
Start: 2021-06-12 | End: 2021-06-12

## 2021-06-12 RX ORDER — HYDROCODONE BITARTRATE AND ACETAMINOPHEN 5; 325 MG/1; MG/1
1 TABLET ORAL EVERY 6 HOURS PRN
Qty: 11 TABLET | Refills: 0 | Status: SHIPPED | OUTPATIENT
Start: 2021-06-12 | End: 2021-06-12 | Stop reason: SDUPTHER

## 2021-06-12 RX ORDER — HYDROCODONE BITARTRATE AND ACETAMINOPHEN 5; 325 MG/1; MG/1
1 TABLET ORAL EVERY 6 HOURS PRN
Qty: 11 TABLET | Refills: 0 | Status: SHIPPED | OUTPATIENT
Start: 2021-06-12 | End: 2021-06-15

## 2021-06-12 RX ADMIN — HYDROCODONE BITARTRATE AND ACETAMINOPHEN 2 TABLET: 5; 325 TABLET ORAL at 01:55

## 2021-06-12 RX ADMIN — FENTANYL CITRATE 100 MCG: 50 INJECTION, SOLUTION INTRAMUSCULAR; INTRAVENOUS at 01:26

## 2021-06-12 ASSESSMENT — PAIN DESCRIPTION - PAIN TYPE
TYPE: ACUTE PAIN
TYPE: ACUTE PAIN

## 2021-06-12 NOTE — ED PROVIDER NOTES
"ED Provider Note        Primary care provider: Pcp Pt States None    I verified that the patient was wearing a mask and I was wearing appropriate PPE every time I entered the room. The patient's mask was on the patient at all times during my encounter except for a brief view of the oropharynx.      CHIEF COMPLAINT  Chief Complaint   Patient presents with   • T-5000 FALL     Pt states he was getting a tatoo and fell out of the tatoo chair. Per pt \"my ankle snapped in half.\" +tenderness and swelling to LLE. Pt states he also hit his face on the ground, +epistaxis, bleeding controlled. Pt states his pain is 10/10       HPI  Vega Harrell is a 46 y.o. male who presents to the Emergency Department with chief complaint of left ankle pain.  Patient was getting the tattoo this evening he got up out of the chair and states that his ankle gave way underneath him.  He did fall the ground struck his nose had some nosebleed but that is since resolved no loss of conscious no neck pain no chest or abdominal pain has been otherwise well recently.  Pain is ankle is severe worse with any manipulation or attempted ambulation no alleviating factors.  Patient does report drinking earlier in the evening he is agitated very verbally aggressive towards nursing staff.    REVIEW OF SYSTEMS  Positive for left ankle pain negative for loss of consciousness chest abdominal pelvic pain pain in other extremities or recent illness    PAST MEDICAL HISTORY   has a past medical history of Diabetes (HCC) and Sleep apnea.    SURGICAL HISTORY  patient denies any surgical history    SOCIAL HISTORY  Social History     Tobacco Use   • Smoking status: Current Every Day Smoker     Packs/day: 1.00     Types: Cigarettes   • Smokeless tobacco: Never Used   Substance Use Topics   • Alcohol use: Yes     Comment: \"a lot everyday\"    • Drug use: Yes     Comment: DMT      Social History     Substance and Sexual Activity   Drug Use Yes    Comment: DMT " "      FAMILY HISTORY  Non-Contributory    CURRENT MEDICATIONS  Home Medications    **Home medications have not yet been reviewed for this encounter**         ALLERGIES  Allergies   Allergen Reactions   • Tagafed [Triprolidine-Pse]        PHYSICAL EXAM  VITAL SIGNS: /86   Pulse (!) 122   Temp 36.5 °C (97.7 °F) (Temporal)   Resp 16   Ht 1.702 m (5' 7\")   Wt 102 kg (225 lb)   SpO2 95%   BMI 35.24 kg/m²     Constitutional: Alert and oriented x 3, Distress  HEENT: Atraumatic normocephalic, pupils are equal round, extraocular movements are intact. The nares is clear, external ears are normal, mouth shows moist mucous membranes  Neck: no obvious JVD or tracheal deviation  Cardiovascular: Regular rate and rhythm no murmur rub or gallop   Thorax & Lungs: No respiratory distress, no wheezes rales or rhonchi, No chest tenderness.   GI: Soft nontender nondistended positive bowel sounds, no peritoneal signs  Skin: Warm dry no obvious acute rash or lesion  Musculoskeletal: Bilateral upper right lower extremity are unremarkable in the left lower extremity patient has full range and strength at the hip and knee he has tenderness to palpation medial and lateral malleolus with minimal edema and ecchymosis in that area normal cap refill and sensation range of motion all toes limited dorsiflexion plantarflexion due to pain.  Neurologic: Cranial nerves III through XII are grossly intact, no sensory deficit, no cerebellar dysfunction   Psychiatric: Agitated verbally abusive to nursing staff      DIAGNOSTIC STUDIES / PROCEDURES      RADIOLOGY  DX-TIBIA AND FIBULA LEFT   Final Result      Unchanged distal fibular fracture. No proximal tibial or fibular fractures.      DX-ANKLE 3+ VIEWS LEFT   Final Result      Acute, minimally comminuted, predominantly obliquely oriented fracture of the distal fibula, at and above the level of the ankle joint.        The radiologist's interpretation of all radiological studies have been " "reviewed by me.    COURSE & MEDICAL DECISION MAKING  Pertinent Labs & Imaging studies reviewed. (See chart for details)        Patient noted to have slightly elevated blood pressure likely circumstantial secondary to presenting complaint. Referred to primary care physician for further evaluation.      Medical Decision Making: Distal fibula fracture as above splinted in short posterior with stirrup splint crutches for ambulation nonweightbearing until followed up by orthopedist follow-up with orthopedist in 5 to 7 days return here for worsening pain numbness tingling weakness any other acute symptoms or concerns otherwise discharged in stable and improved condition.    /88   Pulse 98   Temp 36.7 °C (98 °F) (Temporal)   Resp 16   Ht 1.702 m (5' 7\")   Wt 102 kg (225 lb)   SpO2 98%   BMI 35.24 kg/m²     Jack Owens M.D.  [address] [facility] [address]  [phone]    Schedule an appointment as soon as possible for a visit       [facility], Emergency Dept  [facility] [facility] [address] [phone]    in 12-24 hours if symptoms persist, immediately If symptoms worsen, or if you develop any other symptoms or concerns    Prescription monitoring program queried and unremarkable.  Patient counseled on the risks of controlled substances including potential risks and benefits proper use alternative treatments, cause the symptoms, provisions of treatment plan, risk of dependence addiction and overdose method safely dispose of the medication, the fact that they would be given no refills from the emergency department.     In prescribing controlled substances to this patient, I certify that I have obtained and reviewed the medical history of Vega Harrell. I have also made a good idalia effort to obtain applicable records from other providers who have treated the patient and records did not demonstrate any increased risk of substance abuse that would prevent me from " prescribing controlled substances.     I have conducted a physical exam and documented it. I have reviewed Mr. Harrell’s prescription history as maintained by the [place] Prescription Monitoring Program.     I have assessed the patient’s risk for abuse, dependency, and addiction using the validated Opioid Risk Tool available at https://www.mdcalc.com/nwkplb-mtlu-huyd-ort-narcotic-abuse.     Given the above, I believe the benefits of controlled substance therapy outweigh the risks. The reasons for prescribing controlled substances include non-narcotic, oral analgesic alternatives have been inadequate for pain control. Accordingly, I have discussed the risk and benefits, treatment plan, and alternative therapies with the patient.         FINAL IMPRESSION  1. Closed fracture of distal end of left fibula, unspecified fracture morphology, initial encounter Active         This dictation has been created using voice recognition software and/or scribes. The accuracy of the dictation is limited by the abilities of the software and the expertise of the scribes. I expect there may be some errors of grammar and possibly content. I made every attempt to manually correct the errors within my dictation. However, errors related to voice recognition software and/or scribes may still exist and should be interpreted within the appropriate context.

## 2021-06-12 NOTE — DISCHARGE INSTRUCTIONS
You fractured your distal fibula this evening. Nonweightbearing in this extremity until evaluated by orthopedics. Return for worsening pain swelling numbness tingling any other acute symptoms or concerns otherwise follow-up with orthopedics within 1 week for repeat evaluation

## 2021-06-12 NOTE — ED NOTES
Security officers asked the patient several times to wear a mask and patient refused.Patient is continuously cursing at staff.  The patient was asked to wait outside until called because he refuses to wear a mask properly.

## 2021-06-12 NOTE — ED NOTES
"ED staff followed patient to the restroom to find the patient drinking water out of the sink. The patient stated \"I am diabetic I need water.\" Despite staff's best efforts the patient would not stop drinking water from the sink and began cursing at staff.   "

## 2021-06-12 NOTE — ED TRIAGE NOTES
"Chief Complaint   Patient presents with   • T-5000 FALL     Pt states he was getting a tatoo and fell out of the tatoo chair. Per pt \"my ankle snapped in half.\" +tenderness and swelling to LLE. Pt states he also hit his face on the ground, +epistaxis, bleeding controlled. Pt states his pain is 10/10       Wheelchair to triage for above complaint.   Educated on triage process, encourage to inform staff of any changes.     /86   Pulse (!) 122   Temp 36.5 °C (97.7 °F) (Temporal)   Resp 16   Ht 1.702 m (5' 7\")   Wt 102 kg (225 lb)   SpO2 95%   BMI 35.24 kg/m²     "

## 2021-11-10 ENCOUNTER — HOSPITAL ENCOUNTER (OUTPATIENT)
Facility: MEDICAL CENTER | Age: 46
End: 2021-11-11
Attending: EMERGENCY MEDICINE | Admitting: STUDENT IN AN ORGANIZED HEALTH CARE EDUCATION/TRAINING PROGRAM
Payer: COMMERCIAL

## 2021-11-10 ENCOUNTER — APPOINTMENT (OUTPATIENT)
Dept: RADIOLOGY | Facility: MEDICAL CENTER | Age: 46
End: 2021-11-10
Attending: EMERGENCY MEDICINE
Payer: COMMERCIAL

## 2021-11-10 LAB
ACETONE UR QL: ABNORMAL
ALBUMIN SERPL BCP-MCNC: 4.3 G/DL (ref 3.2–4.9)
ALBUMIN/GLOB SERPL: 1.4 G/DL
ALP SERPL-CCNC: 157 U/L (ref 30–99)
ALT SERPL-CCNC: 21 U/L (ref 2–50)
AMMONIA PLAS-SCNC: 18 UMOL/L (ref 11–45)
ANION GAP SERPL CALC-SCNC: 13 MMOL/L (ref 7–16)
APPEARANCE UR: CLEAR
AST SERPL-CCNC: 17 U/L (ref 12–45)
B-OH-BUTYR SERPL-MCNC: 0.85 MMOL/L (ref 0.02–0.27)
BASE EXCESS BLDV CALC-SCNC: 1 MMOL/L
BASOPHILS # BLD AUTO: 0.4 % (ref 0–1.8)
BASOPHILS # BLD: 0.03 K/UL (ref 0–0.12)
BILIRUB SERPL-MCNC: 0.3 MG/DL (ref 0.1–1.5)
BILIRUB UR QL STRIP.AUTO: NEGATIVE
BODY TEMPERATURE: ABNORMAL CENTIGRADE
BUN SERPL-MCNC: 12 MG/DL (ref 8–22)
CALCIUM SERPL-MCNC: 10 MG/DL (ref 8.5–10.5)
CHLORIDE SERPL-SCNC: 92 MMOL/L (ref 96–112)
CO2 SERPL-SCNC: 23 MMOL/L (ref 20–33)
COLOR UR: YELLOW
CREAT SERPL-MCNC: 0.85 MG/DL (ref 0.5–1.4)
EOSINOPHIL # BLD AUTO: 0.3 K/UL (ref 0–0.51)
EOSINOPHIL NFR BLD: 4 % (ref 0–6.9)
ERYTHROCYTE [DISTWIDTH] IN BLOOD BY AUTOMATED COUNT: 35.9 FL (ref 35.9–50)
GLOBULIN SER CALC-MCNC: 3.1 G/DL (ref 1.9–3.5)
GLUCOSE SERPL-MCNC: 657 MG/DL (ref 65–99)
GLUCOSE UR STRIP.AUTO-MCNC: >=1000 MG/DL
HCO3 BLDV-SCNC: 26 MMOL/L (ref 24–28)
HCT VFR BLD AUTO: 45 % (ref 42–52)
HGB BLD-MCNC: 16.6 G/DL (ref 14–18)
IMM GRANULOCYTES # BLD AUTO: 0.03 K/UL (ref 0–0.11)
IMM GRANULOCYTES NFR BLD AUTO: 0.4 % (ref 0–0.9)
KETONES UR STRIP.AUTO-MCNC: 15 MG/DL
LEUKOCYTE ESTERASE UR QL STRIP.AUTO: NEGATIVE
LIPASE SERPL-CCNC: 18 U/L (ref 11–82)
LYMPHOCYTES # BLD AUTO: 1.96 K/UL (ref 1–4.8)
LYMPHOCYTES NFR BLD: 26.2 % (ref 22–41)
MAGNESIUM SERPL-MCNC: 2.3 MG/DL (ref 1.5–2.5)
MCH RBC QN AUTO: 31.2 PG (ref 27–33)
MCHC RBC AUTO-ENTMCNC: 36.9 G/DL (ref 33.7–35.3)
MCV RBC AUTO: 84.6 FL (ref 81.4–97.8)
MICRO URNS: ABNORMAL
MONOCYTES # BLD AUTO: 0.5 K/UL (ref 0–0.85)
MONOCYTES NFR BLD AUTO: 6.7 % (ref 0–13.4)
NEUTROPHILS # BLD AUTO: 4.66 K/UL (ref 1.82–7.42)
NEUTROPHILS NFR BLD: 62.3 % (ref 44–72)
NITRITE UR QL STRIP.AUTO: NEGATIVE
NRBC # BLD AUTO: 0 K/UL
NRBC BLD-RTO: 0 /100 WBC
OSMOLALITY SERPL: 302 MOSM/KG H2O (ref 278–298)
PCO2 BLDV: 44 MMHG (ref 41–51)
PH BLDV: 7.39 [PH] (ref 7.31–7.45)
PH UR STRIP.AUTO: 5 [PH] (ref 5–8)
PHOSPHATE SERPL-MCNC: 3.6 MG/DL (ref 2.5–4.5)
PLATELET # BLD AUTO: 202 K/UL (ref 164–446)
PMV BLD AUTO: 11.2 FL (ref 9–12.9)
PO2 BLDV: 45.8 MMHG (ref 25–40)
POTASSIUM SERPL-SCNC: 4.3 MMOL/L (ref 3.6–5.5)
PROT SERPL-MCNC: 7.4 G/DL (ref 6–8.2)
PROT UR QL STRIP: NEGATIVE MG/DL
RBC # BLD AUTO: 5.32 M/UL (ref 4.7–6.1)
RBC UR QL AUTO: NEGATIVE
SAO2 % BLDV: 82.3 %
SODIUM SERPL-SCNC: 128 MMOL/L (ref 135–145)
SP GR UR STRIP.AUTO: 1.04
UROBILINOGEN UR STRIP.AUTO-MCNC: 0.2 MG/DL
WBC # BLD AUTO: 7.5 K/UL (ref 4.8–10.8)

## 2021-11-10 PROCEDURE — 700105 HCHG RX REV CODE 258: Performed by: EMERGENCY MEDICINE

## 2021-11-10 PROCEDURE — 83690 ASSAY OF LIPASE: CPT

## 2021-11-10 PROCEDURE — 85025 COMPLETE CBC W/AUTO DIFF WBC: CPT

## 2021-11-10 PROCEDURE — 83930 ASSAY OF BLOOD OSMOLALITY: CPT

## 2021-11-10 PROCEDURE — 81002 URINALYSIS NONAUTO W/O SCOPE: CPT

## 2021-11-10 PROCEDURE — 82010 KETONE BODYS QUAN: CPT

## 2021-11-10 PROCEDURE — 81003 URINALYSIS AUTO W/O SCOPE: CPT

## 2021-11-10 PROCEDURE — 82140 ASSAY OF AMMONIA: CPT

## 2021-11-10 PROCEDURE — 700102 HCHG RX REV CODE 250 W/ 637 OVERRIDE(OP): Performed by: EMERGENCY MEDICINE

## 2021-11-10 PROCEDURE — C9803 HOPD COVID-19 SPEC COLLECT: HCPCS | Performed by: EMERGENCY MEDICINE

## 2021-11-10 PROCEDURE — 96375 TX/PRO/DX INJ NEW DRUG ADDON: CPT

## 2021-11-10 PROCEDURE — 83735 ASSAY OF MAGNESIUM: CPT

## 2021-11-10 PROCEDURE — 83036 HEMOGLOBIN GLYCOSYLATED A1C: CPT

## 2021-11-10 PROCEDURE — 99285 EMERGENCY DEPT VISIT HI MDM: CPT

## 2021-11-10 PROCEDURE — 0241U HCHG SARS-COV-2 COVID-19 NFCT DS RESP RNA 4 TRGT MIC: CPT

## 2021-11-10 PROCEDURE — 70450 CT HEAD/BRAIN W/O DYE: CPT

## 2021-11-10 PROCEDURE — 80053 COMPREHEN METABOLIC PANEL: CPT

## 2021-11-10 PROCEDURE — 82962 GLUCOSE BLOOD TEST: CPT

## 2021-11-10 PROCEDURE — 82803 BLOOD GASES ANY COMBINATION: CPT

## 2021-11-10 PROCEDURE — 84100 ASSAY OF PHOSPHORUS: CPT

## 2021-11-10 RX ORDER — SODIUM CHLORIDE, SODIUM LACTATE, POTASSIUM CHLORIDE, CALCIUM CHLORIDE 600; 310; 30; 20 MG/100ML; MG/100ML; MG/100ML; MG/100ML
1000 INJECTION, SOLUTION INTRAVENOUS ONCE
Status: COMPLETED | OUTPATIENT
Start: 2021-11-10 | End: 2021-11-11

## 2021-11-10 RX ADMIN — SODIUM CHLORIDE, POTASSIUM CHLORIDE, SODIUM LACTATE AND CALCIUM CHLORIDE 1000 ML: 600; 310; 30; 20 INJECTION, SOLUTION INTRAVENOUS at 22:35

## 2021-11-10 RX ADMIN — INSULIN HUMAN 10 UNITS: 100 INJECTION, SOLUTION PARENTERAL at 23:49

## 2021-11-10 RX ADMIN — SODIUM CHLORIDE, POTASSIUM CHLORIDE, SODIUM LACTATE AND CALCIUM CHLORIDE 1000 ML: 600; 310; 30; 20 INJECTION, SOLUTION INTRAVENOUS at 22:37

## 2021-11-10 ASSESSMENT — FIBROSIS 4 INDEX: FIB4 SCORE: 0.91

## 2021-11-11 VITALS
SYSTOLIC BLOOD PRESSURE: 148 MMHG | HEART RATE: 71 BPM | TEMPERATURE: 97.1 F | BODY MASS INDEX: 36.3 KG/M2 | DIASTOLIC BLOOD PRESSURE: 96 MMHG | HEIGHT: 67 IN | OXYGEN SATURATION: 94 % | RESPIRATION RATE: 16 BRPM | WEIGHT: 231.26 LBS

## 2021-11-11 PROBLEM — E11.00 HYPEROSMOLAR HYPERGLYCEMIC STATE (HHS) (HCC): Status: ACTIVE | Noted: 2021-11-11

## 2021-11-11 PROBLEM — E66.9 CLASS 1 OBESITY IN ADULT: Status: ACTIVE | Noted: 2021-11-11

## 2021-11-11 PROBLEM — E87.1 HYPONATREMIA: Status: ACTIVE | Noted: 2021-11-11

## 2021-11-11 PROBLEM — F10.10 ALCOHOL ABUSE: Status: ACTIVE | Noted: 2021-11-11

## 2021-11-11 PROBLEM — F17.200 SMOKER: Status: ACTIVE | Noted: 2021-11-11

## 2021-11-11 LAB
ALBUMIN SERPL BCP-MCNC: 3.6 G/DL (ref 3.2–4.9)
ALBUMIN SERPL BCP-MCNC: 3.6 G/DL (ref 3.2–4.9)
ALBUMIN/GLOB SERPL: 1.5 G/DL
ALBUMIN/GLOB SERPL: 1.6 G/DL
ALP SERPL-CCNC: 118 U/L (ref 30–99)
ALP SERPL-CCNC: 122 U/L (ref 30–99)
ALT SERPL-CCNC: 16 U/L (ref 2–50)
ALT SERPL-CCNC: 16 U/L (ref 2–50)
AMPHET UR QL SCN: POSITIVE
ANION GAP SERPL CALC-SCNC: 5 MMOL/L (ref 7–16)
ANION GAP SERPL CALC-SCNC: 8 MMOL/L (ref 7–16)
ANION GAP SERPL CALC-SCNC: 8 MMOL/L (ref 7–16)
AST SERPL-CCNC: 13 U/L (ref 12–45)
AST SERPL-CCNC: 14 U/L (ref 12–45)
B-OH-BUTYR SERPL-MCNC: 0.48 MMOL/L (ref 0.02–0.27)
BARBITURATES UR QL SCN: NEGATIVE
BASE EXCESS BLDV CALC-SCNC: 2 MMOL/L
BASOPHILS # BLD AUTO: 0.3 % (ref 0–1.8)
BASOPHILS # BLD AUTO: 0.5 % (ref 0–1.8)
BASOPHILS # BLD: 0.02 K/UL (ref 0–0.12)
BASOPHILS # BLD: 0.03 K/UL (ref 0–0.12)
BENZODIAZ UR QL SCN: NEGATIVE
BILIRUB SERPL-MCNC: 0.4 MG/DL (ref 0.1–1.5)
BILIRUB SERPL-MCNC: 0.5 MG/DL (ref 0.1–1.5)
BODY TEMPERATURE: NORMAL CENTIGRADE
BUN SERPL-MCNC: 11 MG/DL (ref 8–22)
BUN SERPL-MCNC: 11 MG/DL (ref 8–22)
BUN SERPL-MCNC: 13 MG/DL (ref 8–22)
BZE UR QL SCN: NEGATIVE
CALCIUM SERPL-MCNC: 8.9 MG/DL (ref 8.5–10.5)
CALCIUM SERPL-MCNC: 8.9 MG/DL (ref 8.5–10.5)
CALCIUM SERPL-MCNC: 9 MG/DL (ref 8.5–10.5)
CANNABINOIDS UR QL SCN: NEGATIVE
CHLORIDE SERPL-SCNC: 100 MMOL/L (ref 96–112)
CHLORIDE SERPL-SCNC: 101 MMOL/L (ref 96–112)
CHLORIDE SERPL-SCNC: 103 MMOL/L (ref 96–112)
CO2 SERPL-SCNC: 24 MMOL/L (ref 20–33)
CO2 SERPL-SCNC: 24 MMOL/L (ref 20–33)
CO2 SERPL-SCNC: 28 MMOL/L (ref 20–33)
CREAT SERPL-MCNC: 0.75 MG/DL (ref 0.5–1.4)
CREAT SERPL-MCNC: 0.75 MG/DL (ref 0.5–1.4)
CREAT SERPL-MCNC: 0.8 MG/DL (ref 0.5–1.4)
EOSINOPHIL # BLD AUTO: 0.32 K/UL (ref 0–0.51)
EOSINOPHIL # BLD AUTO: 0.33 K/UL (ref 0–0.51)
EOSINOPHIL NFR BLD: 4.7 % (ref 0–6.9)
EOSINOPHIL NFR BLD: 5.1 % (ref 0–6.9)
ERYTHROCYTE [DISTWIDTH] IN BLOOD BY AUTOMATED COUNT: 36.3 FL (ref 35.9–50)
ERYTHROCYTE [DISTWIDTH] IN BLOOD BY AUTOMATED COUNT: 37.5 FL (ref 35.9–50)
EST. AVERAGE GLUCOSE BLD GHB EST-MCNC: 329 MG/DL
ETHANOL BLD-MCNC: <10.1 MG/DL (ref 0–10)
FLUAV RNA SPEC QL NAA+PROBE: NEGATIVE
FLUBV RNA SPEC QL NAA+PROBE: NEGATIVE
GLOBULIN SER CALC-MCNC: 2.3 G/DL (ref 1.9–3.5)
GLOBULIN SER CALC-MCNC: 2.4 G/DL (ref 1.9–3.5)
GLUCOSE BLD-MCNC: 247 MG/DL (ref 65–99)
GLUCOSE BLD-MCNC: 291 MG/DL (ref 65–99)
GLUCOSE BLD-MCNC: 348 MG/DL (ref 65–99)
GLUCOSE BLD-MCNC: 359 MG/DL (ref 65–99)
GLUCOSE BLD-MCNC: 368 MG/DL (ref 65–99)
GLUCOSE BLD-MCNC: 477 MG/DL (ref 65–99)
GLUCOSE BLD-MCNC: >600 MG/DL (ref 65–99)
GLUCOSE SERPL-MCNC: 325 MG/DL (ref 65–99)
GLUCOSE SERPL-MCNC: 391 MG/DL (ref 65–99)
GLUCOSE SERPL-MCNC: 421 MG/DL (ref 65–99)
HBA1C MFR BLD: 13.1 % (ref 4–5.6)
HCO3 BLDV-SCNC: 28 MMOL/L (ref 24–28)
HCT VFR BLD AUTO: 40.3 % (ref 42–52)
HCT VFR BLD AUTO: 41.8 % (ref 42–52)
HGB BLD-MCNC: 14.9 G/DL (ref 14–18)
HGB BLD-MCNC: 14.9 G/DL (ref 14–18)
IMM GRANULOCYTES # BLD AUTO: 0.03 K/UL (ref 0–0.11)
IMM GRANULOCYTES # BLD AUTO: 0.03 K/UL (ref 0–0.11)
IMM GRANULOCYTES NFR BLD AUTO: 0.4 % (ref 0–0.9)
IMM GRANULOCYTES NFR BLD AUTO: 0.5 % (ref 0–0.9)
LYMPHOCYTES # BLD AUTO: 1.92 K/UL (ref 1–4.8)
LYMPHOCYTES # BLD AUTO: 1.99 K/UL (ref 1–4.8)
LYMPHOCYTES NFR BLD: 28.4 % (ref 22–41)
LYMPHOCYTES NFR BLD: 30.8 % (ref 22–41)
MAGNESIUM SERPL-MCNC: 2 MG/DL (ref 1.5–2.5)
MAGNESIUM SERPL-MCNC: 2.1 MG/DL (ref 1.5–2.5)
MAGNESIUM SERPL-MCNC: 2.2 MG/DL (ref 1.5–2.5)
MCH RBC QN AUTO: 30.9 PG (ref 27–33)
MCH RBC QN AUTO: 31.7 PG (ref 27–33)
MCHC RBC AUTO-ENTMCNC: 35.6 G/DL (ref 33.7–35.3)
MCHC RBC AUTO-ENTMCNC: 37 G/DL (ref 33.7–35.3)
MCV RBC AUTO: 85.7 FL (ref 81.4–97.8)
MCV RBC AUTO: 86.7 FL (ref 81.4–97.8)
METHADONE UR QL SCN: NEGATIVE
MONOCYTES # BLD AUTO: 0.4 K/UL (ref 0–0.85)
MONOCYTES # BLD AUTO: 0.47 K/UL (ref 0–0.85)
MONOCYTES NFR BLD AUTO: 6.4 % (ref 0–13.4)
MONOCYTES NFR BLD AUTO: 6.7 % (ref 0–13.4)
NEUTROPHILS # BLD AUTO: 3.53 K/UL (ref 1.82–7.42)
NEUTROPHILS # BLD AUTO: 4.17 K/UL (ref 1.82–7.42)
NEUTROPHILS NFR BLD: 56.7 % (ref 44–72)
NEUTROPHILS NFR BLD: 59.5 % (ref 44–72)
NRBC # BLD AUTO: 0 K/UL
NRBC # BLD AUTO: 0 K/UL
NRBC BLD-RTO: 0 /100 WBC
NRBC BLD-RTO: 0 /100 WBC
OPIATES UR QL SCN: NEGATIVE
OSMOLALITY SERPL: 292 MOSM/KG H2O (ref 278–298)
OXYCODONE UR QL SCN: NEGATIVE
PCO2 BLDV: 48.3 MMHG (ref 41–51)
PCP UR QL SCN: NEGATIVE
PH BLDV: 7.38 [PH] (ref 7.31–7.45)
PHOSPHATE SERPL-MCNC: 2.5 MG/DL (ref 2.5–4.5)
PHOSPHATE SERPL-MCNC: 2.5 MG/DL (ref 2.5–4.5)
PHOSPHATE SERPL-MCNC: 3.6 MG/DL (ref 2.5–4.5)
PLATELET # BLD AUTO: 165 K/UL (ref 164–446)
PLATELET # BLD AUTO: 174 K/UL (ref 164–446)
PMV BLD AUTO: 11.1 FL (ref 9–12.9)
PMV BLD AUTO: 11.1 FL (ref 9–12.9)
PO2 BLDV: 32.7 MMHG (ref 25–40)
POTASSIUM SERPL-SCNC: 4 MMOL/L (ref 3.6–5.5)
POTASSIUM SERPL-SCNC: 4.2 MMOL/L (ref 3.6–5.5)
POTASSIUM SERPL-SCNC: 4.3 MMOL/L (ref 3.6–5.5)
PROPOXYPH UR QL SCN: NEGATIVE
PROT SERPL-MCNC: 5.9 G/DL (ref 6–8.2)
PROT SERPL-MCNC: 6 G/DL (ref 6–8.2)
RBC # BLD AUTO: 4.7 M/UL (ref 4.7–6.1)
RBC # BLD AUTO: 4.82 M/UL (ref 4.7–6.1)
RSV RNA SPEC QL NAA+PROBE: NEGATIVE
SAO2 % BLDV: 65.4 %
SARS-COV-2 RNA RESP QL NAA+PROBE: NOTDETECTED
SODIUM SERPL-SCNC: 133 MMOL/L (ref 135–145)
SODIUM SERPL-SCNC: 133 MMOL/L (ref 135–145)
SODIUM SERPL-SCNC: 135 MMOL/L (ref 135–145)
SPECIMEN SOURCE: NORMAL
WBC # BLD AUTO: 6.2 K/UL (ref 4.8–10.8)
WBC # BLD AUTO: 7 K/UL (ref 4.8–10.8)

## 2021-11-11 PROCEDURE — 99219 PR INITIAL OBSERVATION CARE,LEVL II: CPT | Mod: 25 | Performed by: STUDENT IN AN ORGANIZED HEALTH CARE EDUCATION/TRAINING PROGRAM

## 2021-11-11 PROCEDURE — 99406 BEHAV CHNG SMOKING 3-10 MIN: CPT | Performed by: STUDENT IN AN ORGANIZED HEALTH CARE EDUCATION/TRAINING PROGRAM

## 2021-11-11 PROCEDURE — 82077 ASSAY SPEC XCP UR&BREATH IA: CPT

## 2021-11-11 PROCEDURE — A9270 NON-COVERED ITEM OR SERVICE: HCPCS | Performed by: STUDENT IN AN ORGANIZED HEALTH CARE EDUCATION/TRAINING PROGRAM

## 2021-11-11 PROCEDURE — 82010 KETONE BODYS QUAN: CPT

## 2021-11-11 PROCEDURE — 96366 THER/PROPH/DIAG IV INF ADDON: CPT

## 2021-11-11 PROCEDURE — G0378 HOSPITAL OBSERVATION PER HR: HCPCS

## 2021-11-11 PROCEDURE — 84100 ASSAY OF PHOSPHORUS: CPT

## 2021-11-11 PROCEDURE — 80307 DRUG TEST PRSMV CHEM ANLYZR: CPT

## 2021-11-11 PROCEDURE — 96365 THER/PROPH/DIAG IV INF INIT: CPT

## 2021-11-11 PROCEDURE — 700102 HCHG RX REV CODE 250 W/ 637 OVERRIDE(OP): Performed by: STUDENT IN AN ORGANIZED HEALTH CARE EDUCATION/TRAINING PROGRAM

## 2021-11-11 PROCEDURE — 96372 THER/PROPH/DIAG INJ SC/IM: CPT

## 2021-11-11 PROCEDURE — 700102 HCHG RX REV CODE 250 W/ 637 OVERRIDE(OP): Performed by: INTERNAL MEDICINE

## 2021-11-11 PROCEDURE — 700105 HCHG RX REV CODE 258: Performed by: STUDENT IN AN ORGANIZED HEALTH CARE EDUCATION/TRAINING PROGRAM

## 2021-11-11 PROCEDURE — 700101 HCHG RX REV CODE 250: Performed by: STUDENT IN AN ORGANIZED HEALTH CARE EDUCATION/TRAINING PROGRAM

## 2021-11-11 PROCEDURE — 82803 BLOOD GASES ANY COMBINATION: CPT

## 2021-11-11 PROCEDURE — 36415 COLL VENOUS BLD VENIPUNCTURE: CPT

## 2021-11-11 PROCEDURE — 82962 GLUCOSE BLOOD TEST: CPT | Mod: 91

## 2021-11-11 PROCEDURE — 96372 THER/PROPH/DIAG INJ SC/IM: CPT | Mod: XU

## 2021-11-11 PROCEDURE — 85025 COMPLETE CBC W/AUTO DIFF WBC: CPT

## 2021-11-11 PROCEDURE — 83930 ASSAY OF BLOOD OSMOLALITY: CPT

## 2021-11-11 PROCEDURE — 80048 BASIC METABOLIC PNL TOTAL CA: CPT

## 2021-11-11 PROCEDURE — 80053 COMPREHEN METABOLIC PANEL: CPT

## 2021-11-11 PROCEDURE — 83735 ASSAY OF MAGNESIUM: CPT

## 2021-11-11 RX ORDER — MAGNESIUM SULFATE HEPTAHYDRATE 40 MG/ML
2 INJECTION, SOLUTION INTRAVENOUS
Status: DISCONTINUED | OUTPATIENT
Start: 2021-11-11 | End: 2021-11-11

## 2021-11-11 RX ORDER — DEXTROSE MONOHYDRATE 25 G/50ML
50 INJECTION, SOLUTION INTRAVENOUS
Status: DISCONTINUED | OUTPATIENT
Start: 2021-11-11 | End: 2021-11-11

## 2021-11-11 RX ORDER — ONDANSETRON 2 MG/ML
4 INJECTION INTRAMUSCULAR; INTRAVENOUS EVERY 4 HOURS PRN
Status: DISCONTINUED | OUTPATIENT
Start: 2021-11-11 | End: 2021-11-12 | Stop reason: HOSPADM

## 2021-11-11 RX ORDER — NICOTINE 21 MG/24HR
21 PATCH, TRANSDERMAL 24 HOURS TRANSDERMAL
Status: DISCONTINUED | OUTPATIENT
Start: 2021-11-11 | End: 2021-11-12 | Stop reason: HOSPADM

## 2021-11-11 RX ORDER — GAUZE BANDAGE 2" X 2"
100 BANDAGE TOPICAL DAILY
Status: DISCONTINUED | OUTPATIENT
Start: 2021-11-12 | End: 2021-11-12 | Stop reason: HOSPADM

## 2021-11-11 RX ORDER — DEXTROSE AND SODIUM CHLORIDE 5; .9 G/100ML; G/100ML
INJECTION, SOLUTION INTRAVENOUS CONTINUOUS
Status: DISCONTINUED | OUTPATIENT
Start: 2021-11-11 | End: 2021-11-11

## 2021-11-11 RX ORDER — INSULIN LISPRO 100 [IU]/ML
1-6 INJECTION, SOLUTION INTRAVENOUS; SUBCUTANEOUS
Status: DISCONTINUED | OUTPATIENT
Start: 2021-11-11 | End: 2021-11-11

## 2021-11-11 RX ORDER — ONDANSETRON 4 MG/1
4 TABLET, ORALLY DISINTEGRATING ORAL EVERY 4 HOURS PRN
Status: DISCONTINUED | OUTPATIENT
Start: 2021-11-11 | End: 2021-11-12 | Stop reason: HOSPADM

## 2021-11-11 RX ORDER — DEXTROSE MONOHYDRATE 25 G/50ML
50 INJECTION, SOLUTION INTRAVENOUS
Status: DISCONTINUED | OUTPATIENT
Start: 2021-11-11 | End: 2021-11-12 | Stop reason: HOSPADM

## 2021-11-11 RX ORDER — PROCHLORPERAZINE EDISYLATE 5 MG/ML
5-10 INJECTION INTRAMUSCULAR; INTRAVENOUS EVERY 4 HOURS PRN
Status: DISCONTINUED | OUTPATIENT
Start: 2021-11-11 | End: 2021-11-12 | Stop reason: HOSPADM

## 2021-11-11 RX ORDER — ACETAMINOPHEN 325 MG/1
650 TABLET ORAL EVERY 6 HOURS PRN
Status: DISCONTINUED | OUTPATIENT
Start: 2021-11-11 | End: 2021-11-12 | Stop reason: HOSPADM

## 2021-11-11 RX ORDER — FOLIC ACID 1 MG/1
1 TABLET ORAL DAILY
Status: DISCONTINUED | OUTPATIENT
Start: 2021-11-12 | End: 2021-11-12 | Stop reason: HOSPADM

## 2021-11-11 RX ORDER — INSULIN LISPRO 100 [IU]/ML
2-9 INJECTION, SOLUTION INTRAVENOUS; SUBCUTANEOUS
Status: DISCONTINUED | OUTPATIENT
Start: 2021-11-11 | End: 2021-11-11

## 2021-11-11 RX ORDER — MAGNESIUM SULFATE HEPTAHYDRATE 40 MG/ML
4 INJECTION, SOLUTION INTRAVENOUS
Status: DISCONTINUED | OUTPATIENT
Start: 2021-11-11 | End: 2021-11-11

## 2021-11-11 RX ORDER — INSULIN LISPRO 100 [IU]/ML
0.2 INJECTION, SOLUTION INTRAVENOUS; SUBCUTANEOUS
Status: DISCONTINUED | OUTPATIENT
Start: 2021-11-11 | End: 2021-11-12 | Stop reason: HOSPADM

## 2021-11-11 RX ORDER — INSULIN LISPRO 100 [IU]/ML
0.2 INJECTION, SOLUTION INTRAVENOUS; SUBCUTANEOUS
Status: DISCONTINUED | OUTPATIENT
Start: 2021-11-11 | End: 2021-11-11

## 2021-11-11 RX ORDER — CLONIDINE HYDROCHLORIDE 0.1 MG/1
0.1 TABLET ORAL EVERY 6 HOURS PRN
Status: DISCONTINUED | OUTPATIENT
Start: 2021-11-11 | End: 2021-11-12 | Stop reason: HOSPADM

## 2021-11-11 RX ORDER — INSULIN LISPRO 100 [IU]/ML
0.2 INJECTION, SOLUTION INTRAVENOUS; SUBCUTANEOUS
Status: DISCONTINUED | OUTPATIENT
Start: 2021-11-12 | End: 2021-11-11

## 2021-11-11 RX ORDER — SODIUM CHLORIDE 9 MG/ML
INJECTION, SOLUTION INTRAVENOUS CONTINUOUS
Status: DISCONTINUED | OUTPATIENT
Start: 2021-11-11 | End: 2021-11-11

## 2021-11-11 RX ORDER — SODIUM CHLORIDE, SODIUM LACTATE, POTASSIUM CHLORIDE, AND CALCIUM CHLORIDE .6; .31; .03; .02 G/100ML; G/100ML; G/100ML; G/100ML
2000 INJECTION, SOLUTION INTRAVENOUS ONCE
Status: DISCONTINUED | OUTPATIENT
Start: 2021-11-11 | End: 2021-11-11

## 2021-11-11 RX ORDER — PROMETHAZINE HYDROCHLORIDE 25 MG/1
12.5-25 SUPPOSITORY RECTAL EVERY 4 HOURS PRN
Status: DISCONTINUED | OUTPATIENT
Start: 2021-11-11 | End: 2021-11-12 | Stop reason: HOSPADM

## 2021-11-11 RX ORDER — PROMETHAZINE HYDROCHLORIDE 25 MG/1
12.5-25 TABLET ORAL EVERY 4 HOURS PRN
Status: DISCONTINUED | OUTPATIENT
Start: 2021-11-11 | End: 2021-11-12 | Stop reason: HOSPADM

## 2021-11-11 RX ORDER — INSULIN LISPRO 100 [IU]/ML
1-6 INJECTION, SOLUTION INTRAVENOUS; SUBCUTANEOUS
Status: DISCONTINUED | OUTPATIENT
Start: 2021-11-11 | End: 2021-11-12 | Stop reason: HOSPADM

## 2021-11-11 RX ORDER — SODIUM CHLORIDE 9 MG/ML
2000 INJECTION, SOLUTION INTRAVENOUS ONCE
Status: DISCONTINUED | OUTPATIENT
Start: 2021-11-11 | End: 2021-11-11

## 2021-11-11 RX ORDER — LABETALOL HYDROCHLORIDE 5 MG/ML
10 INJECTION, SOLUTION INTRAVENOUS EVERY 4 HOURS PRN
Status: DISCONTINUED | OUTPATIENT
Start: 2021-11-11 | End: 2021-11-12 | Stop reason: HOSPADM

## 2021-11-11 RX ORDER — SODIUM CHLORIDE 9 MG/ML
INJECTION, SOLUTION INTRAVENOUS CONTINUOUS
Status: DISCONTINUED | OUTPATIENT
Start: 2021-11-11 | End: 2021-11-12 | Stop reason: HOSPADM

## 2021-11-11 RX ORDER — ENALAPRILAT 1.25 MG/ML
1.25 INJECTION INTRAVENOUS EVERY 6 HOURS PRN
Status: DISCONTINUED | OUTPATIENT
Start: 2021-11-11 | End: 2021-11-12 | Stop reason: HOSPADM

## 2021-11-11 RX ORDER — DEXTROSE AND SODIUM CHLORIDE 10; .45 G/100ML; G/100ML
INJECTION, SOLUTION INTRAVENOUS CONTINUOUS
Status: DISCONTINUED | OUTPATIENT
Start: 2021-11-11 | End: 2021-11-11

## 2021-11-11 RX ADMIN — INSULIN LISPRO 7 UNITS: 100 INJECTION, SOLUTION INTRAVENOUS; SUBCUTANEOUS at 17:43

## 2021-11-11 RX ADMIN — INSULIN GLARGINE 15 UNITS: 100 INJECTION, SOLUTION SUBCUTANEOUS at 01:36

## 2021-11-11 RX ADMIN — INSULIN LISPRO 3 UNITS: 100 INJECTION, SOLUTION INTRAVENOUS; SUBCUTANEOUS at 17:43

## 2021-11-11 RX ADMIN — INSULIN LISPRO 6 UNITS: 100 INJECTION, SOLUTION INTRAVENOUS; SUBCUTANEOUS at 12:04

## 2021-11-11 RX ADMIN — NICOTINE TRANSDERMAL SYSTEM 21 MG: 21 PATCH, EXTENDED RELEASE TRANSDERMAL at 05:19

## 2021-11-11 RX ADMIN — SODIUM CHLORIDE: 9 INJECTION, SOLUTION INTRAVENOUS at 08:39

## 2021-11-11 RX ADMIN — INSULIN LISPRO 5 UNITS: 100 INJECTION, SOLUTION INTRAVENOUS; SUBCUTANEOUS at 20:49

## 2021-11-11 RX ADMIN — THIAMINE HYDROCHLORIDE: 100 INJECTION, SOLUTION INTRAMUSCULAR; INTRAVENOUS at 01:40

## 2021-11-11 RX ADMIN — ACETAMINOPHEN 650 MG: 325 TABLET, FILM COATED ORAL at 02:28

## 2021-11-11 RX ADMIN — SODIUM CHLORIDE: 9 INJECTION, SOLUTION INTRAVENOUS at 16:00

## 2021-11-11 ASSESSMENT — ENCOUNTER SYMPTOMS
WEAKNESS: 0
SHORTNESS OF BREATH: 0
WEIGHT LOSS: 0
BLOOD IN STOOL: 0
FEVER: 0
WEAKNESS: 1
LOSS OF CONSCIOUSNESS: 0
BLURRED VISION: 0
CONSTIPATION: 0
DIZZINESS: 0
ABDOMINAL PAIN: 1
NAUSEA: 1
MYALGIAS: 0
ORTHOPNEA: 0
VOMITING: 0
ABDOMINAL PAIN: 0
CHILLS: 0
EYE PAIN: 0
SORE THROAT: 0
PALPITATIONS: 0
COUGH: 0
EYES NEGATIVE: 1
WHEEZING: 0
DIARRHEA: 0
HEMOPTYSIS: 0
NAUSEA: 0

## 2021-11-11 ASSESSMENT — LIFESTYLE VARIABLES
VISUAL DISTURBANCES: NOT PRESENT
NAUSEA AND VOMITING: NO NAUSEA AND NO VOMITING
ORIENTATION AND CLOUDING OF SENSORIUM: ORIENTED AND CAN DO SERIAL ADDITIONS
TREMOR: TREMOR NOT VISIBLE BUT CAN BE FELT, FINGERTIP TO FINGERTIP
TOTAL SCORE: 2
PAROXYSMAL SWEATS: BARELY PERCEPTIBLE SWEATING. PALMS MOIST
AUDITORY DISTURBANCES: NOT PRESENT
HEADACHE, FULLNESS IN HEAD: NOT PRESENT
ANXIETY: NO ANXIETY (AT EASE)
AGITATION: NORMAL ACTIVITY

## 2021-11-11 ASSESSMENT — FIBROSIS 4 INDEX: FIB4 SCORE: 0.84

## 2021-11-11 NOTE — ED NOTES
Provider recently at bedside. Patient updated on insulin infusion ordered at this time, updated on need to delay breakfast tray until infusion initiated. Patient visitor at bedside, pt sleeping on cart, resting with unlabored respirs, remains on monitoring.

## 2021-11-11 NOTE — ED NOTES
Labs collected and sent to lab. Pt updated on POC, continues stating he is hungry and wants food. Pt again updated/educated on current glucose and diet order, meal delivery time, pt verbalizes understanding. Pt provided with water per request. Resting on cart, placed back on monitoring. Call light within reach.

## 2021-11-11 NOTE — ED TRIAGE NOTES
"Chief Complaint   Patient presents with   • High Blood Sugar     Glucometer reading \"HIGH\"   • Fatigue     Pt reports his friends found him unresponsive today after not feeling well at home for two days. Pts glucometer has been reading high at home, pt has not been able to take his medication. Pt states he may have eaten an entire cake today, he is unsure. Pt is confused with very delayed responses. Charge notified, pt to red 10.     Pt is very lethargic, falling asleep in triage.      Patient and staff wearing appropriate PPE      /91   Pulse (!) 108   Temp 36.4 °C (97.6 °F) (Temporal)   Resp 16   Ht 1.702 m (5' 7\")   Wt 99.6 kg (219 lb 9.3 oz)   SpO2 94%       "

## 2021-11-11 NOTE — ED NOTES
Pt with BG of 477. Pt also with recent food intake brought in by visitor. Hospitalist, Dr Ann immediately made aware. Md stated to recheck BG later

## 2021-11-11 NOTE — ED PROVIDER NOTES
"ED Provider Note    CHIEF COMPLAINT  Chief Complaint   Patient presents with   • High Blood Sugar     Glucometer reading \"HIGH\"   • Fatigue       HPI  Vega Harrell is a 46 y.o. male who presents to the emergency department chief complaint of confusion and hyperglycemia.  The patient states that he normally takes insulin but has not had his long-acting insulin for a while he did use a short acting insulin yesterday.  He just states he feels unwell and generally fatigued he is mildly confused but does not have any acute complaints.  He states he is may have had a little bit of a cough and some generalized abdominal discomfort but no vomiting a little bit of nausea but no diarrhea no fevers no chills no headache no neck stiffness.  He is alert to place and year but a little repetitive and groggy denying any drug or alcohol use today    REVIEW OF SYSTEMS  Positives as above. Pertinent negatives include fevers chills chest pain dyspnea exertion leg swelling headache neck stiffness vomiting diarrhea bloody stools bloody emesis  All other review of systems are negative    PAST MEDICAL HISTORY   has a past medical history of Diabetes (HCC) and Sleep apnea.    SOCIAL HISTORY  Social History     Tobacco Use   • Smoking status: Current Every Day Smoker     Packs/day: 1.00     Types: Cigarettes   • Smokeless tobacco: Never Used   Substance and Sexual Activity   • Alcohol use: Yes     Comment: \"a lot everyday\"    • Drug use: Yes     Comment: DMT   • Sexual activity: Not on file       SURGICAL HISTORY  patient denies any surgical history    CURRENT MEDICATIONS  Home Medications    **Home medications have not yet been reviewed for this encounter**         ALLERGIES  Allergies   Allergen Reactions   • Tagafed [Triprolidine-Pse]        PHYSICAL EXAM  VITAL SIGNS: /91   Pulse (!) 108   Temp 36.4 °C (97.6 °F) (Temporal)   Resp 16   Ht 1.702 m (5' 7\")   Wt 99.6 kg (219 lb 9.3 oz)   SpO2 94%   BMI 34.39 kg/m²  "   Pulse ox interpretation: I interpret this pulse ox as normal.  Constitutional: Sleepy on exam  HENT: Normocephalic atraumatic, mildly dry mucous membranes  Eyes: PER, Conjunctiva normal, Non-icteric.   Neck: Normal range of motion, No tenderness, Supple, No stridor.   Cardiovascular: Tachycardic regular rhythm, no murmurs.   Thorax & Lungs: Normal breath sounds, No respiratory distress, No wheezing, No chest tenderness.   Abdomen: Bowel sounds normal, Soft, No tenderness, No pulsatile masses. No peritoneal signs.  Skin: Warm, Dry, No erythema, No rash.   Back: No bony tenderness, No CVA tenderness.   Extremities/MSK: Intact equal distal pulses, No edema, No tenderness, No cyanosis, no major deformities noted  Neurologic: Alert and oriented to place and year cranial nerves intact strength 5 out of 5 bilateral upper and lower extremities      DIFFERENTIAL DIAGNOSIS AND WORK UP PLAN    This is a 46 y.o. male who presents with altered mental status in setting of hypoglycemia he is a type II but is on insulin and has been without his long-acting for some time he is a little bit lethargic and confused on my exam but nonfocal he does not have any nuchal rigidity I doubt meningitis or encephalitis this could be secondary to HHS or DKA or other infectious source including Covid.  Will evaluate for infection and treat the patient with IV fluids and likely insulin and then reassess    DIAGNOSTIC STUDIES / PROCEDURES    EKG  No results found for this or any previous visit.    LABS  Pertinent Lab Findings  Patient with a normal CBC, CMP with a glucose of 657 without an anion gap acidosis he does have a mild ketonuria and mild ketones in the blood as well with an elevated serum awesome's without acidosis  Normal lipase magnesium phosphorus  Pending urine drug screen      RADIOLOGY  CT-HEAD W/O   Final Result         1.  No acute intracranial abnormality.   2.  Segmental depression of the left medial orbital wall, could  "represent large lamina preparation defect or chronic healed fracture.   3.  Chronic appearing left maxillary sinusitis changes        The radiologist's interpretation of all radiological studies have been reviewed by me.      COURSE & MEDICAL DECISION MAKING  Pertinent Labs & Imaging studies reviewed. (See chart for details)    Patient receiving IV fluids as well as IV insulin there is no evidence of intracranial process or infectious process it may likely due to the like a HHS type situation with symptomatic hyperglycemia.  Patient will be hospitalized for further management I spoke with  who has accepted the patient for hospitalization he is having a positive response to IV fluids        BP (!) 168/85   Pulse 91   Temp 36.8 °C (98.2 °F) (Temporal)   Resp 18   Ht 1.702 m (5' 7\")   Wt 99.6 kg (219 lb 9.3 oz)   SpO2 94%   BMI 34.39 kg/m²   I verified that the patient was wearing a mask and I was wearing appropriate PPE every time I entered the room. The patient's mask was on the patient at all times during my encounter except for a brief view of the oropharynx.          FINAL IMPRESSION  1.  Hyperglycemia  2.  Altered mental status  3.  Medication noncompliance        Electronically signed by: Shayna Huitron M.D., 11/10/2021 10:16 PM    This dictation has been created using voice recognition software and/or scribes. The accuracy of the dictation is limited by the abilities of the software and the expertise of the scribes. I expect there may be some errors of grammar and possibly content. I made every attempt to manually correct the errors within my dictation. However, errors related to voice recognition software and/or scribes may still exist and should be interpreted within the appropriate context.    "

## 2021-11-11 NOTE — ED NOTES
This RN assumes care of patient. Report received from JAILENE Beal. Pt resting on cart, remains on monitoring.

## 2021-11-11 NOTE — PROGRESS NOTES
[facility] Medicine Daily Progress Note    Date of Service  11/11/2021    Chief Complaint  Vega Harrell is a 46 y.o. male admitted 11/10/2021 with fatigue and confusion    Hospital Course  A 46-year-old man with h/o IDDM, alcohol abuse, amphetamine abuse, smoker presented with fatigue and confusion. Patient was found unresponsive by friends today, they were able to arouse him and brought to the ED. Patient was on 12 units levemir bid, 20 units novulog pre-meals. Patient lost his levemir and was not getting it for a week, was doing novulog intremittently.  On admission labs showed sodium 128, glucose 677, beta hydroxybutyric acid 0.85, serum osmolality 302, pH 7.39.  CT head negative.    Interval Problem Update  11/11: This morning patient was very drowsy, responding to verbal stimuli, opening eyes hardly, answering questions, following commands. Patient reports abdominal pain, nauea, polyuria. Afebrile, hemodynamically stable.  Na 135 improved.  glucose 421, CO2 28, AG 5.0, pH 7.38.  Urine toxicology is positive for amphetamines.  Critical care consulted, discussed with Dr. Maya, no need for insulin drip, no need for ICU.      I have personally seen and examined the patient at bedside. I discussed the plan of care with patient, bedside RN and critical care.    Consultants/Specialty  critical care    Code Status  Full Code    Disposition  Patient is not medically cleared.   Anticipate discharge to to home with close outpatient follow-up.  I have placed the appropriate orders for post-discharge needs.    Review of Systems  Review of Systems   Constitutional: Positive for malaise/fatigue. Negative for chills and fever.   HENT: Negative for hearing loss.    Eyes: Negative.    Respiratory: Negative for cough and shortness of breath.    Cardiovascular: Negative for chest pain and leg swelling.   Gastrointestinal: Positive for abdominal pain and nausea.   Genitourinary:        Polyuria    Skin: Negative for rash.    Neurological: Positive for weakness.        Physical Exam  Temp:  [36.4 °C (97.6 °F)-36.8 °C (98.2 °F)] 36.8 °C (98.2 °F)  Pulse:  [] 73  Resp:  [16-25] 20  BP: (121-168)/(66-97) 137/87  SpO2:  [91 %-98 %] 91 %    Physical Exam  Vitals reviewed.   Constitutional:       Appearance: He is obese. He is ill-appearing.   HENT:      Head: Normocephalic and atraumatic.      Mouth/Throat:      Mouth: Mucous membranes are moist.      Pharynx: Oropharynx is clear.   Eyes:      Pupils: Pupils are equal, round, and reactive to light.   Cardiovascular:      Rate and Rhythm: Normal rate and regular rhythm.   Pulmonary:      Effort: Pulmonary effort is normal. No respiratory distress.      Breath sounds: No wheezing or rales.   Abdominal:      General: Abdomen is flat. Bowel sounds are normal.      Tenderness: There is abdominal tenderness (in epigastral area).   Musculoskeletal:         General: Normal range of motion.      Cervical back: Normal range of motion.   Skin:     General: Skin is warm.   Neurological:      Motor: Weakness present.      Comments: very drowsy, responding to verbal stimuli, opening eyes hardly, answering questions, following commands         Fluids    Intake/Output Summary (Last 24 hours) at 11/11/2021 1336  Last data filed at 11/11/2021 1206  Gross per 24 hour   Intake 3000 ml   Output 1250 ml   Net 1750 ml       Laboratory  Recent Labs     11/10/21  2221 11/11/21  0836 11/11/21  1152   WBC 7.5 6.2 7.0   RBC 5.32 4.70 4.82   HEMOGLOBIN 16.6 14.9 14.9   HEMATOCRIT 45.0 40.3* 41.8*   MCV 84.6 85.7 86.7   MCH 31.2 31.7 30.9   MCHC 36.9* 37.0* 35.6*   RDW 35.9 36.3 37.5   PLATELETCT 202 165 174   MPV 11.2 11.1 11.1     Recent Labs     11/10/21  2221 11/11/21  0836 11/11/21  1152   SODIUM 128* 133* 135   POTASSIUM 4.3 4.3 4.0   CHLORIDE 92* 100 103   CO2 23 28 24   GLUCOSE 657* 421* 325*   BUN 12 11 11   CREATININE 0.85 0.80 0.75   CALCIUM 10.0 8.9 8.9                   Imaging  CT-HEAD W/O   Final  Result         1.  No acute intracranial abnormality.   2.  Segmental depression of the left medial orbital wall, could represent large lamina preparation defect or chronic healed fracture.   3.  Chronic appearing left maxillary sinusitis changes           Assessment/Plan  * Hyperosmolar hyperglycemic state (HHS) (HCC)- (present on admission)  Assessment & Plan  Non complaint with DM medications  Glucose running in the 400-500 range past 2 months  Non compliant with DM diet  CT imaging performed for AMS unremarkable. He is currently back to his baseline  Pt does not know his DM medications, but reports he is on 2 PO medications as well as insulin regimen  S/p LR 2L and regular insulin 10u IV x1  - Initiate Inuslin glargine 15u qam, Insulin Lispro 7u TID, and additional ISS  - a1c  - monitor repeat bmps and replace electrolytes as appropriately    Class 1 obesity in adult  Assessment & Plan  Body mass index is 34.39 kg/m².  Outpatient weight loss counseling    Smoker- (present on admission)  Assessment & Plan  Start nicotine replacement therapy with patches and gum.  Smoking cessation counseling    Alcohol abuse- (present on admission)  Assessment & Plan  Difficult to attain actual number of drinks per day, but reports usually drinks at least a beer an hour while awake  Denies any prior history of seizure/withdrawals in the past  Mental status returned to baseline, currently denying any signs or symptoms of withdrawal at this time  - Alcohol level, Utox  - CIWA protocol when symptoms of withdrawal occur  - Multivitamin and RALLY Bag  - Thiamine 100mg qd x4 doses and Folic Acid 1mg qd x4 doses  - Seizure/Fall precautions  - Alcohol cessation counseling    Hyponatremia- (present on admission)  Assessment & Plan  Resolved  Likely secondary to hyperglycemia  - c/w IVF  - monitor       VTE prophylaxis: enoxaparin ppx    I have performed a physical exam and reviewed and updated ROS and Plan today (11/11/2021). In review of  yesterday's note (11/10/2021), there are no changes except as documented above.

## 2021-11-11 NOTE — ED NOTES
Patient calm and in room with SO at bedside. Respirations even and unlabored. No pain or distress reported. LR Boluses infusing. IV insulin given, will recheck BG. Pt to be admitted. Awaiting hospitalist examination

## 2021-11-11 NOTE — ED NOTES
Spoke with pharmacy re: medication orders; awaiting clarification from intensivist/hospitalist for med admin. Awaiting order verification, pt updated. Pt remains on monitoring, visitor at bedside.

## 2021-11-11 NOTE — ED NOTES
Patient calm and in room at this time. Patient had a few episodes of low spo2 (88-89%) while sleeping. Per patient he has a hx of sleep apnea, so he was placed on 2l's oxygen via nasal cannula. Patient now maintaining adequate saturations. Pt to be admitted. Awaiting bed placement.

## 2021-11-11 NOTE — ED NOTES
Tech from Lab called with critical result of glucose of 657 at 2320. Critical lab result read back to tech.   Dr. Huitron notified of critical lab result at 2323.  Critical lab result read back by Dr. Huitron.

## 2021-11-11 NOTE — ED NOTES
Admitting provider previously notified patient c/o being hungry, requesting to eat. Orders placed at this time, POC glucose on recheck 247. Will medicate per order prior to meal delivery.

## 2021-11-11 NOTE — ED NOTES
Patient calm and in room with SO at bedside. Respirations even and unlabored. No pain or distress reported. Patient given meal per request. Patient to be admitted. Awaiting bed placement

## 2021-11-11 NOTE — ASSESSMENT & PLAN NOTE
Difficult to attain actual number of drinks per day, but reports usually drinks at least a beer an hour while awake  Denies any prior history of seizure/withdrawals in the past  Mental status returned to baseline, currently denying any signs or symptoms of withdrawal at this time  - Alcohol level, Utox  - CIWA protocol when symptoms of withdrawal occur  - Multivitamin and RALLY Bag  - Thiamine 100mg qd x4 doses and Folic Acid 1mg qd x4 doses  - Seizure/Fall precautions  - Alcohol cessation counseling

## 2021-11-11 NOTE — ED NOTES
Repeat labs collected and sent to lab per order. Pt updated on NPO status until additional orders received. Pt urinal emptied. Pt visitor at bedside, denies needs or questions. Resting in no distress, remains on monitoring.

## 2021-11-11 NOTE — ASSESSMENT & PLAN NOTE
Non complaint with DM medications  Glucose running in the 400-500 range past 2 months  Non compliant with DM diet  CT imaging performed for AMS unremarkable. He is currently back to his baseline  Pt does not know his DM medications, but reports he is on 2 PO medications as well as insulin regimen  S/p LR 2L and regular insulin 10u IV x1  - Initiate Inuslin glargine 15u qam, Insulin Lispro 7u TID, and additional ISS  - a1c  - monitor repeat bmps and replace electrolytes as appropriately

## 2021-11-11 NOTE — ED NOTES
Patient is resting comfortably, pt remains on monitoring. Pt removes O2 placed while sleeping, pt instructed on importance of leaving in place. Pt resting on cart with HOB elevated, call light within reach.

## 2021-11-11 NOTE — ED NOTES
Patient resting on cart, sleeping soundly with unlabored respirs. Patient wakes to verbal stim, pt AAOx4, placed on 2L O2 via NC at this time, SpO2 while sleeping noted to be 86%, improves quickly while awake. Patient states he is hungry, updated on diet order and current glucose, verbalizes understanding. Pt denies additional needs or questions, call light within reach, HOB elevated, cart in low, locked position. Pt remains on monitoring.

## 2021-11-11 NOTE — ED NOTES
Pt provided with warm blankets, pillows per request. Pt requesting food, again updated on awaiting order verification for insulin for further management of hyperglycemia. Visitor at bedside, pt remains on monitoring.

## 2021-11-11 NOTE — ED NOTES
Med rec incomplete at this time.     Attempted interview, pt not able to answer questions, Signficant other at bedside unable to provide medication use. Pt reported he fills at [facility], [facility] reports no patient profile was found for this pt.     Called [facility], the only active prescription that was reported was Levemir 10 units at bedtime, however pt never picked this medication up.

## 2021-11-11 NOTE — ED NOTES
Patient is resting comfortably, sleeping on cart. hospitalist recently at bedside, pt updated on pending order changes and plan of care. Pt resting with visitor at bedside, remains on monitoring.

## 2021-11-11 NOTE — ED NOTES
Patient is resting comfortably, visitor at bedside. Pt mother updated via phone per patient request and consent. Pt remains on monitoring.

## 2021-11-11 NOTE — H&P
"Hospital Medicine History & Physical Note    Date of Service  11/11/2021    Primary Care Physician  Pcp Pt States None    Consultants  None    Specialist Names: none    Code Status  Full Code    Chief Complaint  Chief Complaint   Patient presents with   • High Blood Sugar     Glucometer reading \"HIGH\"   • Fatigue       History of Presenting Illness  Vega Harrell is a 46 y.o. male with PMH of diabetes, alcohol abuse, active smoker.  Who presented 11/10/2021 with complaints of fatigue and confusion.  Patient reports that for the past 2 months he has been having extremely elevated blood sugars into the 4-500 range.  Additionally, he has felt more fatigued and tired than his usual and he has had significantly increased amounts of urination.  He additionally had instances where he became a little bit confused and did not know where he was, and occurred to again today.  Today, he is reportedly found unresponsive by his friends after he told him that he was not feeling well for the past 2 days, and they were able to arouse him but they needed to help him to get into the car to be brought to the ED.  On arrival to the ED, patient reportedly very lethargic and tired, and extremely slow to respond.  However, during my interview he is currently back to his baseline and is answering questions appropriately.  On questioning, patient does regret that he has history of diabetes and he is noncompliant with his therapy.  He often eats large amounts of sugars and reports that he possibly ate an entire cake today.  He is on 2 oral diabetes medications, 1 of which is Metformin and the other he believes start with \"P\" but he does not take his Metformin and only takes the other medication sporadically.  He is additionally on an insulin regimen which he is also noncompliant with.  Additionally, patient does admit that he has been drinking an excessive amount of alcohol.  He is unsure of the amount, but his significant other at " bedside reports that he drinks at least 1 beer an hour while he is awake.  Patient denies any signs or symptoms of withdrawal at this time.  He denies having any history of withdrawal or seizures in the past.    ED: HR 10 8-81.  CBC unremarkable, , glucose 677, beta hydroxybutyric acid 0.85, serum osmolality 302, UA with ketones, venous BG with a pH of 7.39.  A CT of the head was performed without obvious bleeding or findings of CVA.  Patient received LR 2 L, and insulin regular 10 units IV x1.    I discussed the plan of care with patient and family.    Review of Systems  Review of Systems   Constitutional: Positive for malaise/fatigue. Negative for chills, fever and weight loss.   HENT: Negative for hearing loss and sore throat.    Eyes: Negative for blurred vision and pain.   Respiratory: Negative for cough, hemoptysis, shortness of breath and wheezing.    Cardiovascular: Negative for chest pain, palpitations, orthopnea and leg swelling.   Gastrointestinal: Negative for abdominal pain, blood in stool, constipation, diarrhea, nausea and vomiting.   Genitourinary: Negative for dysuria and hematuria.        Increased urination   Musculoskeletal: Negative for joint pain and myalgias.        Cramping   Skin: Negative for rash.   Neurological: Negative for dizziness, loss of consciousness and weakness.        Confusion   Psychiatric/Behavioral:        Alcohol abuse       Past Medical History   has a past medical history of Diabetes (HCC) and Sleep apnea.    Surgical History  Denies any prior surgical history    Family History  family history includes Diabetes in his mother.   Family history reviewed with patient. There is family history that is pertinent to the chief complaint.     Social History   reports that he has been smoking cigarettes. He has been smoking about 1.00 pack per day. He has never used smokeless tobacco. He reports current alcohol use. He reports current drug use.    Allergies  Allergies    Allergen Reactions   • Tagafed [Triprolidine-Pse]        Medications  Prior to Admission Medications   Prescriptions Last Dose Informant Patient Reported? Taking?   naproxen (NAPROSYN) 500 MG Tab   No No   Sig: Take 1 Tab by mouth 2 times a day, with meals.      Facility-Administered Medications: None       Physical Exam  Temp:  [36.4 °C (97.6 °F)-36.8 °C (98.2 °F)] 36.8 °C (98.2 °F)  Pulse:  [] 91  Resp:  [16-18] 18  BP: (142-168)/(85-97) 168/85  SpO2:  [94 %-95 %] 94 %  Blood Pressure: (!) 168/85   Temperature: 36.8 °C (98.2 °F)   Pulse: 91   Respiration: 18   Pulse Oximetry: 94 %       Physical Exam  Vitals and nursing note reviewed.   Constitutional:       General: He is not in acute distress.     Appearance: Normal appearance. He is not ill-appearing.   HENT:      Mouth/Throat:      Mouth: Mucous membranes are moist.   Eyes:      Extraocular Movements: Extraocular movements intact.   Cardiovascular:      Rate and Rhythm: Regular rhythm. Tachycardia present.      Heart sounds: No murmur heard.  No gallop.    Pulmonary:      Effort: Pulmonary effort is normal.      Breath sounds: Normal breath sounds. No wheezing, rhonchi or rales.   Abdominal:      General: Abdomen is flat. Bowel sounds are normal. There is no distension.      Palpations: Abdomen is soft.      Tenderness: There is no abdominal tenderness.   Musculoskeletal:         General: No deformity. Normal range of motion.      Right lower leg: No edema.      Left lower leg: No edema.   Neurological:      General: No focal deficit present.      Mental Status: He is alert and oriented to person, place, and time.      Cranial Nerves: No cranial nerve deficit.      Sensory: No sensory deficit.      Motor: No weakness.      Coordination: Coordination normal.         Laboratory:  Recent Labs     11/10/21  2221   WBC 7.5   RBC 5.32   HEMOGLOBIN 16.6   HEMATOCRIT 45.0   MCV 84.6   MCH 31.2   MCHC 36.9*   RDW 35.9   PLATELETCT 202   MPV 11.2     Recent Labs      11/10/21  2221   SODIUM 128*   POTASSIUM 4.3   CHLORIDE 92*   CO2 23   GLUCOSE 657*   BUN 12   CREATININE 0.85   CALCIUM 10.0     Recent Labs     11/10/21  2221   ALTSGPT 21   ASTSGOT 17   ALKPHOSPHAT 157*   TBILIRUBIN 0.3   LIPASE 18   GLUCOSE 657*         No results for input(s): NTPROBNP in the last 72 hours.      No results for input(s): TROPONINT in the last 72 hours.    Imaging:  CT-HEAD W/O   Final Result         1.  No acute intracranial abnormality.   2.  Segmental depression of the left medial orbital wall, could represent large lamina preparation defect or chronic healed fracture.   3.  Chronic appearing left maxillary sinusitis changes          no X-Ray or EKG requiring interpretation    Assessment/Plan:  I anticipate this patient is appropriate for observation status at this time.    * Hyperosmolar hyperglycemic state (HHS) (HCC)- (present on admission)  Assessment & Plan  Non complaint with DM medications  Glucose running in the 400-500 range past 2 months  Non compliant with DM diet  CT imaging performed for AMS unremarkable. He is currently back to his baseline  Pt does not know his DM medications, but reports he is on 2 PO medications as well as insulin regimen  S/p LR 2L and regular insulin 10u IV x1  - Initiate Inuslin glargine 15u qam, Insulin Lispro 7u TID, and additional ISS  - pt currently completing second liter of LR, have ordered RALLY bag for alcohol abuse for additional IVF  - a1c  - monitor repeat bmps and replace electrolytes as appropriately    Smoker- (present on admission)  Assessment & Plan  I counseled the patient for 7 minutes regarding smoking cessation using methods such as nicotine replacement therapy with patches and gum and medications such as Wellbutrin or Chantix.  I also discussed with pt breathing techniques and meditation as well as regular physical activity, which will assist pt  with smoking cessation.  Patient is agreeable to trial of nicotine replacement  therapy.    Start nicotine replacement therapy with patches and gum.      Alcohol abuse- (present on admission)  Assessment & Plan  Difficult to attain actual number of drinks per day, but reports usually drinks at least a beer an hour while awake  Denies any prior history of seizure/withdrawals in the past  Mental status returned to baseline, currently denying any signs or symptoms of withdrawal at this time  - Alcohol level, Utox  - CIWA protocol when symptoms of withdrawal occur  - Multivitamin and RALLY Bag  - Thiamine 100mg qd x4 doses and Folic Acid 1mg qd x4 doses  - Seizure/Fall precautions  - Alcohol cessation counseling      Hyponatremia- (present on admission)  Assessment & Plan  Likely secondary to hyperglycemia  - c/w IVF  - monitor      VTE prophylaxis: enoxaparin ppx

## 2021-11-12 NOTE — CARE PLAN
The patient is Stable - Low risk of patient condition declining or worsening    Shift Goals  Clinical Goals: Monitor blood sugar  Patient Goals: Rest    Progress made toward(s) clinical / shift goals:        Problem: Knowledge Deficit - Standard  Goal: Patient and family/care givers will demonstrate understanding of plan of care, disease process/condition, diagnostic tests and medications  Outcome: Progressing     Problem: Seizure Precautions  Goal: Implementation of seizure precautions  Outcome: Progressing     Problem: Risk for Aspiration  Goal: Patient's risk for aspiration will be absent or decrease  Outcome: Progressing

## 2021-11-12 NOTE — PROGRESS NOTES
Assumed care of pt after receiving report from fan RN. Pt is A&Ox 4 on RA. Pt denies pain at this time. Pt irritable at this time, stating he needs to leave due to family issues but stating he does not want to leave AMA - updated pt on POC; pt verbalized understanding. Wife at bedside. 2 RN skin check complete and charted. Bed is locked and in lowest position, call light within reach, fall precautions in place. All needs met at this time.

## 2021-11-12 NOTE — PROGRESS NOTES
Pt leaving AMA despite education. MARTÍNEZ Caldwell notified, AMA form signed. PIV removed. Pt off unit.

## 2021-11-12 NOTE — PROGRESS NOTES
Patient transported to floor from ed. Patient A&Ox4. Plan of care discussed with patient. Family at bedside. Patient oriented to floor and surroundings. Patient currently on room air. VSS. Patient is not expressing any complaints of pain at this time. 2 rn skin check performed. Patient educated to call for assistance before ambulating. Patient education regarding fall risk. Call light within reach. Fall precautions in place.

## 2021-11-12 NOTE — CARE PLAN
The patient is Watcher - Medium risk of patient condition declining or worsening    Shift Goals  Clinical Goals: Pts blood sugars will be monitored and managed  Patient Goals: Rest    Progress made toward(s) clinical / shift goals: NA    Patient is not progressing towards the following goals: Pt shows no learning evidence towards diagnosis and self care. Pt left AMA      Problem: Knowledge Deficit - Standard  Goal: Patient and family/care givers will demonstrate understanding of plan of care, disease process/condition, diagnostic tests and medications  Outcome: Not Progressing     Problem: Lifestyle Changes  Goal: Patient's ability to identify lifestyle changes and available resources to help reduce recurrence of condition will improve  Outcome: Not Progressing     Problem: Psychosocial  Goal: Patient's level of anxiety will decrease  Outcome: Not Progressing  Goal: Spiritual and cultural needs incorporated into hospitalization  Outcome: Not Progressing

## 2021-11-12 NOTE — PROGRESS NOTES
4 Eyes Skin Assessment Completed by Hailey RN and Eric RN.    Head WDL  Ears WDL  Nose WDL  Mouth WDL  Neck WDL  Breast/Chest WDL  Shoulder Blades WDL  Spine WDL  (R) Arm/Elbow/Hand WDL  (L) Arm/Elbow/Hand WDL  Abdomen WDL  Groin WDL  Scrotum/Coccyx/Buttocks WDL  (R) Leg WDL  (L) Leg WDL  (R) Heel/Foot/Toe WDL  (L) Heel/Foot/Toe WDL          Devices In Places PIV      Interventions In Place Pillows and Pressure Redistribution Mattress    Possible Skin Injury No    Pictures Uploaded Into Epic N/A  Wound Consult Placed N/A  RN Wound Prevention Protocol Ordered No